# Patient Record
Sex: MALE | Race: WHITE | Employment: FULL TIME | URBAN - METROPOLITAN AREA
[De-identification: names, ages, dates, MRNs, and addresses within clinical notes are randomized per-mention and may not be internally consistent; named-entity substitution may affect disease eponyms.]

---

## 2024-03-21 ENCOUNTER — APPOINTMENT (OUTPATIENT)
Dept: CT IMAGING | Age: 54
End: 2024-03-21
Payer: COMMERCIAL

## 2024-03-21 ENCOUNTER — HOSPITAL ENCOUNTER (INPATIENT)
Age: 54
LOS: 1 days | Discharge: HOME | End: 2024-03-22
Attending: EMERGENCY MEDICINE | Admitting: HOSPITALIST
Payer: COMMERCIAL

## 2024-03-21 ENCOUNTER — APPOINTMENT (OUTPATIENT)
Dept: MRI IMAGING | Age: 54
End: 2024-03-21
Payer: COMMERCIAL

## 2024-03-21 DIAGNOSIS — R20.0 NUMBNESS: Primary | ICD-10-CM

## 2024-03-21 LAB
ALBUMIN SERPL-MCNC: 4.6 G/DL (ref 3.4–5)
ALBUMIN/GLOB SERPL: 1.4 {RATIO} (ref 1.1–2.2)
ALP SERPL-CCNC: 77 U/L (ref 40–129)
ALT SERPL-CCNC: 16 U/L (ref 10–40)
ANION GAP SERPL CALCULATED.3IONS-SCNC: 12 MMOL/L (ref 3–16)
AST SERPL-CCNC: 21 U/L (ref 15–37)
BASOPHILS # BLD: 0.1 K/UL (ref 0–0.2)
BASOPHILS NFR BLD: 0.7 %
BILIRUB SERPL-MCNC: 0.4 MG/DL (ref 0–1)
BUN SERPL-MCNC: 15 MG/DL (ref 7–20)
CALCIUM SERPL-MCNC: 10.3 MG/DL (ref 8.3–10.6)
CHLORIDE SERPL-SCNC: 102 MMOL/L (ref 99–110)
CO2 SERPL-SCNC: 27 MMOL/L (ref 21–32)
CREAT SERPL-MCNC: 0.9 MG/DL (ref 0.9–1.3)
DEPRECATED RDW RBC AUTO: 14.5 % (ref 12.4–15.4)
EOSINOPHIL # BLD: 0.4 K/UL (ref 0–0.6)
EOSINOPHIL NFR BLD: 4.4 %
GFR SERPLBLD CREATININE-BSD FMLA CKD-EPI: >60 ML/MIN/{1.73_M2}
GLUCOSE BLD-MCNC: 95 MG/DL (ref 70–99)
GLUCOSE SERPL-MCNC: 98 MG/DL (ref 70–99)
HCT VFR BLD AUTO: 45.7 % (ref 40.5–52.5)
HGB BLD-MCNC: 15.1 G/DL (ref 13.5–17.5)
IMM GRANULOCYTES # BLD: 0 K/UL (ref 0–0.2)
IMM GRANULOCYTES NFR BLD: 0.2 %
LYMPHOCYTES # BLD: 3.5 K/UL (ref 1–5.1)
LYMPHOCYTES NFR BLD: 40 %
MCH RBC QN AUTO: 29.2 PG (ref 26–34)
MCHC RBC AUTO-ENTMCNC: 33 G/DL (ref 32–36.4)
MCV RBC AUTO: 88.2 FL (ref 80–100)
MONOCYTES # BLD: 0.9 K/UL (ref 0–1.3)
MONOCYTES NFR BLD: 10.4 %
NEUTROPHILS # BLD: 3.9 K/UL (ref 1.7–7.7)
NEUTROPHILS NFR BLD: 44.3 %
PERFORMED ON: NORMAL
PLATELET # BLD AUTO: 269 K/UL (ref 135–450)
PMV BLD AUTO: 9.4 FL (ref 5–10.5)
POTASSIUM SERPL-SCNC: 3.7 MMOL/L (ref 3.5–5.1)
PROT SERPL-MCNC: 8 G/DL (ref 6.4–8.2)
RBC # BLD AUTO: 5.18 M/UL (ref 4.2–5.9)
SODIUM SERPL-SCNC: 141 MMOL/L (ref 136–145)
WBC # BLD AUTO: 8.8 K/UL (ref 4–11)

## 2024-03-21 PROCEDURE — 6370000000 HC RX 637 (ALT 250 FOR IP): Performed by: EMERGENCY MEDICINE

## 2024-03-21 PROCEDURE — 70450 CT HEAD/BRAIN W/O DYE: CPT

## 2024-03-21 PROCEDURE — 96375 TX/PRO/DX INJ NEW DRUG ADDON: CPT

## 2024-03-21 PROCEDURE — 2060000000 HC ICU INTERMEDIATE R&B

## 2024-03-21 PROCEDURE — 70498 CT ANGIOGRAPHY NECK: CPT

## 2024-03-21 PROCEDURE — 70551 MRI BRAIN STEM W/O DYE: CPT

## 2024-03-21 PROCEDURE — 85025 COMPLETE CBC W/AUTO DIFF WBC: CPT

## 2024-03-21 PROCEDURE — 6360000002 HC RX W HCPCS: Performed by: HOSPITALIST

## 2024-03-21 PROCEDURE — 2580000003 HC RX 258: Performed by: EMERGENCY MEDICINE

## 2024-03-21 PROCEDURE — 2580000003 HC RX 258: Performed by: HOSPITALIST

## 2024-03-21 PROCEDURE — 80053 COMPREHEN METABOLIC PANEL: CPT

## 2024-03-21 PROCEDURE — 99285 EMERGENCY DEPT VISIT HI MDM: CPT

## 2024-03-21 PROCEDURE — 94760 N-INVAS EAR/PLS OXIMETRY 1: CPT

## 2024-03-21 PROCEDURE — 6370000000 HC RX 637 (ALT 250 FOR IP): Performed by: HOSPITALIST

## 2024-03-21 PROCEDURE — 6360000002 HC RX W HCPCS: Performed by: EMERGENCY MEDICINE

## 2024-03-21 PROCEDURE — 36415 COLL VENOUS BLD VENIPUNCTURE: CPT

## 2024-03-21 PROCEDURE — 6360000004 HC RX CONTRAST MEDICATION: Performed by: EMERGENCY MEDICINE

## 2024-03-21 PROCEDURE — 82947 ASSAY GLUCOSE BLOOD QUANT: CPT

## 2024-03-21 PROCEDURE — 96374 THER/PROPH/DIAG INJ IV PUSH: CPT

## 2024-03-21 RX ORDER — SODIUM CHLORIDE 9 MG/ML
INJECTION, SOLUTION INTRAVENOUS PRN
Status: DISCONTINUED | OUTPATIENT
Start: 2024-03-21 | End: 2024-03-22 | Stop reason: HOSPADM

## 2024-03-21 RX ORDER — SODIUM CHLORIDE 0.9 % (FLUSH) 0.9 %
5-40 SYRINGE (ML) INJECTION PRN
Status: DISCONTINUED | OUTPATIENT
Start: 2024-03-21 | End: 2024-03-22 | Stop reason: HOSPADM

## 2024-03-21 RX ORDER — POTASSIUM CHLORIDE 20 MEQ/1
40 TABLET, EXTENDED RELEASE ORAL PRN
Status: DISCONTINUED | OUTPATIENT
Start: 2024-03-21 | End: 2024-03-22 | Stop reason: HOSPADM

## 2024-03-21 RX ORDER — ONDANSETRON 2 MG/ML
4 INJECTION INTRAMUSCULAR; INTRAVENOUS EVERY 6 HOURS PRN
Status: DISCONTINUED | OUTPATIENT
Start: 2024-03-21 | End: 2024-03-22

## 2024-03-21 RX ORDER — SODIUM CHLORIDE 0.9 % (FLUSH) 0.9 %
5-40 SYRINGE (ML) INJECTION EVERY 12 HOURS SCHEDULED
Status: DISCONTINUED | OUTPATIENT
Start: 2024-03-21 | End: 2024-03-22

## 2024-03-21 RX ORDER — ONDANSETRON 2 MG/ML
4 INJECTION INTRAMUSCULAR; INTRAVENOUS EVERY 6 HOURS PRN
Status: DISCONTINUED | OUTPATIENT
Start: 2024-03-21 | End: 2024-03-21

## 2024-03-21 RX ORDER — METOCLOPRAMIDE HYDROCHLORIDE 5 MG/ML
10 INJECTION INTRAMUSCULAR; INTRAVENOUS ONCE
Status: COMPLETED | OUTPATIENT
Start: 2024-03-21 | End: 2024-03-21

## 2024-03-21 RX ORDER — ASPIRIN 300 MG/1
300 SUPPOSITORY RECTAL DAILY
Status: DISCONTINUED | OUTPATIENT
Start: 2024-03-21 | End: 2024-03-21

## 2024-03-21 RX ORDER — ACETAMINOPHEN 325 MG/1
650 TABLET ORAL EVERY 6 HOURS PRN
Status: DISCONTINUED | OUTPATIENT
Start: 2024-03-21 | End: 2024-03-22 | Stop reason: HOSPADM

## 2024-03-21 RX ORDER — ASPIRIN 81 MG/1
324 TABLET, CHEWABLE ORAL ONCE
Status: COMPLETED | OUTPATIENT
Start: 2024-03-21 | End: 2024-03-21

## 2024-03-21 RX ORDER — POTASSIUM CHLORIDE 7.45 MG/ML
10 INJECTION INTRAVENOUS PRN
Status: DISCONTINUED | OUTPATIENT
Start: 2024-03-21 | End: 2024-03-21

## 2024-03-21 RX ORDER — ONDANSETRON 2 MG/ML
4 INJECTION INTRAMUSCULAR; INTRAVENOUS EVERY 6 HOURS PRN
Status: DISCONTINUED | OUTPATIENT
Start: 2024-03-21 | End: 2024-03-22 | Stop reason: HOSPADM

## 2024-03-21 RX ORDER — POLYETHYLENE GLYCOL 3350 17 G/17G
17 POWDER, FOR SOLUTION ORAL DAILY PRN
Status: DISCONTINUED | OUTPATIENT
Start: 2024-03-21 | End: 2024-03-22

## 2024-03-21 RX ORDER — OMEPRAZOLE 40 MG/1
40 CAPSULE, DELAYED RELEASE ORAL DAILY
COMMUNITY
Start: 2015-07-22

## 2024-03-21 RX ORDER — ONDANSETRON 4 MG/1
4 TABLET, ORALLY DISINTEGRATING ORAL EVERY 8 HOURS PRN
Status: DISCONTINUED | OUTPATIENT
Start: 2024-03-21 | End: 2024-03-21

## 2024-03-21 RX ORDER — POLYETHYLENE GLYCOL 3350 17 G/17G
17 POWDER, FOR SOLUTION ORAL DAILY PRN
Status: DISCONTINUED | OUTPATIENT
Start: 2024-03-21 | End: 2024-03-21

## 2024-03-21 RX ORDER — ENOXAPARIN SODIUM 100 MG/ML
40 INJECTION SUBCUTANEOUS DAILY
Status: DISCONTINUED | OUTPATIENT
Start: 2024-03-21 | End: 2024-03-21

## 2024-03-21 RX ORDER — MAGNESIUM SULFATE IN WATER 40 MG/ML
2000 INJECTION, SOLUTION INTRAVENOUS PRN
Status: DISCONTINUED | OUTPATIENT
Start: 2024-03-21 | End: 2024-03-22 | Stop reason: HOSPADM

## 2024-03-21 RX ORDER — SODIUM CHLORIDE 9 MG/ML
INJECTION, SOLUTION INTRAVENOUS PRN
Status: DISCONTINUED | OUTPATIENT
Start: 2024-03-21 | End: 2024-03-22

## 2024-03-21 RX ORDER — ASPIRIN 81 MG/1
81 TABLET, CHEWABLE ORAL DAILY
Status: DISCONTINUED | OUTPATIENT
Start: 2024-03-21 | End: 2024-03-21

## 2024-03-21 RX ORDER — PANTOPRAZOLE SODIUM 40 MG/1
40 TABLET, DELAYED RELEASE ORAL
Status: DISCONTINUED | OUTPATIENT
Start: 2024-03-22 | End: 2024-03-22 | Stop reason: HOSPADM

## 2024-03-21 RX ORDER — ROSUVASTATIN CALCIUM 40 MG/1
40 TABLET, COATED ORAL NIGHTLY
Status: DISCONTINUED | OUTPATIENT
Start: 2024-03-21 | End: 2024-03-22 | Stop reason: HOSPADM

## 2024-03-21 RX ORDER — 0.9 % SODIUM CHLORIDE 0.9 %
1000 INTRAVENOUS SOLUTION INTRAVENOUS ONCE
Status: COMPLETED | OUTPATIENT
Start: 2024-03-21 | End: 2024-03-21

## 2024-03-21 RX ORDER — ONDANSETRON 4 MG/1
4 TABLET, ORALLY DISINTEGRATING ORAL EVERY 8 HOURS PRN
Status: DISCONTINUED | OUTPATIENT
Start: 2024-03-21 | End: 2024-03-22

## 2024-03-21 RX ORDER — POLYETHYLENE GLYCOL 3350 17 G/17G
17 POWDER, FOR SOLUTION ORAL DAILY PRN
Status: DISCONTINUED | OUTPATIENT
Start: 2024-03-21 | End: 2024-03-22 | Stop reason: HOSPADM

## 2024-03-21 RX ORDER — POTASSIUM CHLORIDE 20 MEQ/1
40 TABLET, EXTENDED RELEASE ORAL PRN
Status: DISCONTINUED | OUTPATIENT
Start: 2024-03-21 | End: 2024-03-21

## 2024-03-21 RX ORDER — ACETAMINOPHEN 325 MG/1
650 TABLET ORAL EVERY 6 HOURS PRN
Status: DISCONTINUED | OUTPATIENT
Start: 2024-03-21 | End: 2024-03-21 | Stop reason: SDUPTHER

## 2024-03-21 RX ORDER — SODIUM CHLORIDE 0.9 % (FLUSH) 0.9 %
5-40 SYRINGE (ML) INJECTION PRN
Status: DISCONTINUED | OUTPATIENT
Start: 2024-03-21 | End: 2024-03-22

## 2024-03-21 RX ORDER — ACETAMINOPHEN 650 MG/1
650 SUPPOSITORY RECTAL EVERY 6 HOURS PRN
Status: DISCONTINUED | OUTPATIENT
Start: 2024-03-21 | End: 2024-03-22 | Stop reason: HOSPADM

## 2024-03-21 RX ORDER — DIPHENHYDRAMINE HYDROCHLORIDE 50 MG/ML
25 INJECTION INTRAMUSCULAR; INTRAVENOUS ONCE
Status: COMPLETED | OUTPATIENT
Start: 2024-03-21 | End: 2024-03-21

## 2024-03-21 RX ORDER — ASPIRIN 81 MG/1
81 TABLET, CHEWABLE ORAL DAILY
Status: DISCONTINUED | OUTPATIENT
Start: 2024-03-22 | End: 2024-03-22 | Stop reason: HOSPADM

## 2024-03-21 RX ORDER — ENOXAPARIN SODIUM 100 MG/ML
40 INJECTION SUBCUTANEOUS NIGHTLY
Status: DISCONTINUED | OUTPATIENT
Start: 2024-03-21 | End: 2024-03-21

## 2024-03-21 RX ORDER — ONDANSETRON 4 MG/1
4 TABLET, ORALLY DISINTEGRATING ORAL EVERY 8 HOURS PRN
Status: DISCONTINUED | OUTPATIENT
Start: 2024-03-21 | End: 2024-03-22 | Stop reason: HOSPADM

## 2024-03-21 RX ORDER — SODIUM CHLORIDE 0.9 % (FLUSH) 0.9 %
5-40 SYRINGE (ML) INJECTION EVERY 12 HOURS SCHEDULED
Status: DISCONTINUED | OUTPATIENT
Start: 2024-03-21 | End: 2024-03-22 | Stop reason: HOSPADM

## 2024-03-21 RX ORDER — ACETAMINOPHEN 650 MG/1
650 SUPPOSITORY RECTAL EVERY 6 HOURS PRN
Status: DISCONTINUED | OUTPATIENT
Start: 2024-03-21 | End: 2024-03-21 | Stop reason: SDUPTHER

## 2024-03-21 RX ORDER — POTASSIUM CHLORIDE 7.45 MG/ML
10 INJECTION INTRAVENOUS PRN
Status: DISCONTINUED | OUTPATIENT
Start: 2024-03-21 | End: 2024-03-22 | Stop reason: HOSPADM

## 2024-03-21 RX ORDER — ENOXAPARIN SODIUM 100 MG/ML
40 INJECTION SUBCUTANEOUS NIGHTLY
Status: DISCONTINUED | OUTPATIENT
Start: 2024-03-21 | End: 2024-03-22 | Stop reason: HOSPADM

## 2024-03-21 RX ADMIN — DIPHENHYDRAMINE HYDROCHLORIDE 25 MG: 50 INJECTION INTRAMUSCULAR; INTRAVENOUS at 07:26

## 2024-03-21 RX ADMIN — ASPIRIN 324 MG: 81 TABLET, CHEWABLE ORAL at 09:44

## 2024-03-21 RX ADMIN — Medication 10 ML: at 21:03

## 2024-03-21 RX ADMIN — IOMEPROL INJECTION 100 ML: 714 INJECTION, SOLUTION INTRAVASCULAR at 07:47

## 2024-03-21 RX ADMIN — ROSUVASTATIN CALCIUM 40 MG: 40 TABLET, FILM COATED ORAL at 21:00

## 2024-03-21 RX ADMIN — ENOXAPARIN SODIUM 40 MG: 100 INJECTION SUBCUTANEOUS at 21:00

## 2024-03-21 RX ADMIN — SODIUM CHLORIDE 1000 ML: 9 INJECTION, SOLUTION INTRAVENOUS at 07:26

## 2024-03-21 RX ADMIN — METOCLOPRAMIDE 10 MG: 5 INJECTION, SOLUTION INTRAMUSCULAR; INTRAVENOUS at 07:29

## 2024-03-21 ASSESSMENT — PAIN - FUNCTIONAL ASSESSMENT
PAIN_FUNCTIONAL_ASSESSMENT: NONE - DENIES PAIN
PAIN_FUNCTIONAL_ASSESSMENT: 0-10
PAIN_FUNCTIONAL_ASSESSMENT: 0-10

## 2024-03-21 ASSESSMENT — PAIN SCALES - GENERAL
PAINLEVEL_OUTOF10: 0

## 2024-03-21 NOTE — ED PROVIDER NOTES
Emergency Physician Note  [facility]W PROGRESSIVE CARE    Pt Name: Eh Price  MRN: [id number]  Birthdate 1970  Date of evaluation: 3/21/2024  Provider: Brando King MD  PCP: No primary care provider on file.    Note Open Time: 6:46 PM EDT 3/21/24    Chief Complaint  Numbness (Pt states he started with numbness to left hip and leg 2 days ago, states yesterday felt numbness to left side and 12 hours ago numbness spread to left shoulder and arm)       History of Present Illness  Eh Price is a 53 y.o. male who presents to the ED for numbness.  Patient reports that he had some left leg and hip numbness 2 days ago that he related to some back pain.  And then since yesterday has had left facial and arm numbness.  He also has a right-sided temporal headache.  He has a history of migraines.  He denies any other complaints.  No prior history of stroke or TIA.  Patient denies any fevers, chills or sweats.  No chest pain or shortness of breath.  No vomiting.  No recent illnesses.  No trouble with his vision or speech or balance.  No weakness.    History from : Patient    Limitations to history : None    REVIEW OF SYSTEMS :      Review of Systems    Positives and Pertinent negatives as per HPI.     Medications/allergies/medical/social/family history  I have reviewed the following from the nursing documentation:      Prior to Admission medications    Not on File       Allergies as of 03/21/2024    (No Known Allergies)       No past medical history on file.     Surgical History: No past surgical history on file.     Family History:  No family history on file.    Social History     Socioeconomic History    Marital status: Single     Spouse name: Not on file    Number of children: Not on file    Years of education: Not on file    Highest education level: Not on file   Occupational History    Not on file   Tobacco Use    Smoking status: Not on file    Smokeless tobacco: Not on file   Substance and Sexual Activity     by any other providers.          I am the Primary Clinician of Record.    FINAL IMPRESSION      1. Numbness          DISPOSITION/PLAN     Pt is in stable condition upon Admit to telemetry.    PATIENT REFERRED TO:  No follow-up provider specified.    DISCHARGE MEDICATIONS:  There are no discharge medications for this patient.      DISCONTINUED MEDICATIONS:  There are no discharge medications for this patient.             (Please note that portions of this note were completed with a voice recognition program.  Efforts were made to edit the dictations but occasionally words are mis-transcribed.)    Brando King MD (electronically signed)          This chart was generated using the Dragon dictation system.  I created this record but it may contain dictation errors.          Brando King MD  03/21/24 6634

## 2024-03-21 NOTE — PROGRESS NOTES
4 Eyes Skin Assessment     NAME:  Eh Price  YOB: 1970  MEDICAL RECORD NUMBER:  [id number]    The patient is being assessed for  Admission    I agree that at least one RN has performed a thorough Head to Toe Skin Assessment on the patient. ALL assessment sites listed below have been assessed.      Areas assessed by both nurses:    Head, Face, Ears, Shoulders, Back, Chest, Arms, Elbows, Hands, Sacrum. Buttock, Coccyx, Ischium, and Legs. Feet and Heels        Does the Patient have a Wound? No noted wound(s)       Miki Prevention initiated by RN: Yes  Wound Care Orders initiated by RN: No    Pressure Injury (Stage 3,4, Unstageable, DTI, NWPT, and Complex wounds) if present, place Wound referral order by RN under : No    New Ostomies, if present place, Ostomy referral order under : No     Nurse 1 eSignature: Electronically signed by Sarah Rodrigues RN on 3/21/24 at 5:39 PM EDT    **SHARE this note so that the co-signing nurse can place an eSignature**    Nurse 2 eSignature: Electronically signed by Cm Casiano RN on 3/22/24 at 4:00 AM EDT

## 2024-03-21 NOTE — ED NOTES
Numbness started 2 days ago in left leg.  Patient stated he would wait till the next day have it checked out.  He decided to wait and then this morning when he woke up and had face numbness and drove himself to the ER.

## 2024-03-21 NOTE — H&P
V2.0  History and Physical      Name:  Eh Price /Age/Sex: 1970  (53 y.o. male)   MRN & CSN:  [id number] & [id number] Encounter Date/Time: 3/21/2024 3:19 PM EDT   Location:  [place] [address]/5107-01 PCP: No primary care provider on file.       Hospital Day: 1    Assessment and Plan:         #. Left sided paresthesias in a patient with hx of migrain   ? Complex migrain  TIA  can not be excluded  Symptoms onset 2 days prior to admission  No moter deficit  CT head , CTA head and neck Non acute  Obtain MRI  Initiate stroke protocol with frequent neuro check  Aic, lipid, MRI, echo  Aspirin, statin, plavix  Neuro consult    #. Hx of GERD  PPI    DVT Prophylaxis: lovenox  Code status: full support      Chief Complain:    Left side numbness/ paresthesias.   History of Present Illness:     Patient was hx of migraine, gerd who presented to [facility] as a transfer from [place] after experience paresthesias of the left side. States initially started involving the left Lower extremity 2 days ago, then left upper extremity yesterday and then his left face and scalp last night. Denies any moter deficits. Denies sob, chest pain, nausea, vomiting.      Review of Systems:        Pertinent positives and negatives discussed in HPI     Objective:   No intake or output data in the 24 hours ending 24 1519   Vitals:   Vitals:    24 1346 24 1356 24 1406 24 1445   BP: (!) 159/90 (!) 137/92  (!) 158/94   Pulse: 66 67  69   Resp: 12   18   Temp:   97.5 °F (36.4 °C) 98.2 °F (36.8 °C)   TempSrc:   Tympanic Oral   SpO2:    97%   Weight:       Height:           Medications Prior to Admission     Prior to Admission medications    Not on File       Physical Exam:    Physical Exam     General:awake, alert, in NAD  Eyes: EOMI  ENT: neck supple  Cardiovascular: Regular rate and Rhythm, normal S1, S2. No immures  Respiratory: Clear to auscultation  Gastrointestinal: Soft, non tender, no organomegaly,

## 2024-03-22 VITALS
RESPIRATION RATE: 16 BRPM | TEMPERATURE: 98.4 F | OXYGEN SATURATION: 95 % | SYSTOLIC BLOOD PRESSURE: 136 MMHG | BODY MASS INDEX: 27.82 KG/M2 | HEIGHT: 69 IN | WEIGHT: 187.83 LBS | DIASTOLIC BLOOD PRESSURE: 87 MMHG | HEART RATE: 69 BPM

## 2024-03-22 LAB
ANION GAP SERPL CALCULATED.3IONS-SCNC: 10 MMOL/L (ref 3–16)
BUN SERPL-MCNC: 11 MG/DL (ref 7–20)
CALCIUM SERPL-MCNC: 9.4 MG/DL (ref 8.3–10.6)
CHLORIDE SERPL-SCNC: 101 MMOL/L (ref 99–110)
CHOLEST SERPL-MCNC: 208 MG/DL (ref 0–199)
CO2 SERPL-SCNC: 25 MMOL/L (ref 21–32)
CREAT SERPL-MCNC: 0.7 MG/DL (ref 0.9–1.3)
DEPRECATED RDW RBC AUTO: 15.4 % (ref 12.4–15.4)
EST. AVERAGE GLUCOSE BLD GHB EST-MCNC: 114 MG/DL
GFR SERPLBLD CREATININE-BSD FMLA CKD-EPI: >60 ML/MIN/{1.73_M2}
GLUCOSE SERPL-MCNC: 87 MG/DL (ref 70–99)
HBA1C MFR BLD: 5.6 %
HCT VFR BLD AUTO: 43.5 % (ref 40.5–52.5)
HDLC SERPL-MCNC: 49 MG/DL (ref 40–60)
HGB BLD-MCNC: 14.7 G/DL (ref 13.5–17.5)
LDLC SERPL CALC-MCNC: 133 MG/DL
MCH RBC QN AUTO: 29.4 PG (ref 26–34)
MCHC RBC AUTO-ENTMCNC: 33.7 G/DL (ref 31–36)
MCV RBC AUTO: 87.2 FL (ref 80–100)
PLATELET # BLD AUTO: 249 K/UL (ref 135–450)
PLATELET BLD QL SMEAR: ADEQUATE
PMV BLD AUTO: 8.5 FL (ref 5–10.5)
POTASSIUM SERPL-SCNC: 3.9 MMOL/L (ref 3.5–5.1)
RBC # BLD AUTO: 4.99 M/UL (ref 4.2–5.9)
SODIUM SERPL-SCNC: 136 MMOL/L (ref 136–145)
TRIGL SERPL-MCNC: 129 MG/DL (ref 0–150)
VLDLC SERPL CALC-MCNC: 26 MG/DL
WBC # BLD AUTO: 8 K/UL (ref 4–11)

## 2024-03-22 PROCEDURE — 85027 COMPLETE CBC AUTOMATED: CPT

## 2024-03-22 PROCEDURE — 80061 LIPID PANEL: CPT

## 2024-03-22 PROCEDURE — 2580000003 HC RX 258: Performed by: HOSPITALIST

## 2024-03-22 PROCEDURE — 80048 BASIC METABOLIC PNL TOTAL CA: CPT

## 2024-03-22 PROCEDURE — 6370000000 HC RX 637 (ALT 250 FOR IP): Performed by: HOSPITALIST

## 2024-03-22 PROCEDURE — 36415 COLL VENOUS BLD VENIPUNCTURE: CPT

## 2024-03-22 PROCEDURE — 6360000002 HC RX W HCPCS: Performed by: HOSPITALIST

## 2024-03-22 PROCEDURE — 93306 TTE W/DOPPLER COMPLETE: CPT

## 2024-03-22 PROCEDURE — 9990000010 HC NO CHARGE VISIT: Performed by: PHYSICAL THERAPIST

## 2024-03-22 PROCEDURE — 94760 N-INVAS EAR/PLS OXIMETRY 1: CPT

## 2024-03-22 PROCEDURE — 83036 HEMOGLOBIN GLYCOSYLATED A1C: CPT

## 2024-03-22 RX ORDER — PROCHLORPERAZINE EDISYLATE 5 MG/ML
10 INJECTION INTRAMUSCULAR; INTRAVENOUS ONCE
Status: COMPLETED | OUTPATIENT
Start: 2024-03-22 | End: 2024-03-22

## 2024-03-22 RX ORDER — NICOTINE 21 MG/24HR
1 PATCH, TRANSDERMAL 24 HOURS TRANSDERMAL DAILY
Status: DISCONTINUED | OUTPATIENT
Start: 2024-03-22 | End: 2024-03-22 | Stop reason: HOSPADM

## 2024-03-22 RX ORDER — KETOROLAC TROMETHAMINE 15 MG/ML
15 INJECTION, SOLUTION INTRAMUSCULAR; INTRAVENOUS ONCE
Status: COMPLETED | OUTPATIENT
Start: 2024-03-22 | End: 2024-03-22

## 2024-03-22 RX ORDER — DIPHENHYDRAMINE HYDROCHLORIDE 50 MG/ML
12.5 INJECTION INTRAMUSCULAR; INTRAVENOUS ONCE
Status: COMPLETED | OUTPATIENT
Start: 2024-03-22 | End: 2024-03-22

## 2024-03-22 RX ADMIN — PANTOPRAZOLE SODIUM 40 MG: 40 TABLET, DELAYED RELEASE ORAL at 08:02

## 2024-03-22 RX ADMIN — ACETAMINOPHEN 325MG 650 MG: 325 TABLET ORAL at 08:02

## 2024-03-22 RX ADMIN — ASPIRIN 81 MG: 81 TABLET, CHEWABLE ORAL at 08:02

## 2024-03-22 RX ADMIN — DIPHENHYDRAMINE HYDROCHLORIDE 12.5 MG: 50 INJECTION INTRAMUSCULAR; INTRAVENOUS at 10:29

## 2024-03-22 RX ADMIN — PROCHLORPERAZINE EDISYLATE 10 MG: 5 INJECTION INTRAMUSCULAR; INTRAVENOUS at 10:29

## 2024-03-22 RX ADMIN — KETOROLAC TROMETHAMINE 15 MG: 15 INJECTION, SOLUTION INTRAMUSCULAR; INTRAVENOUS at 10:29

## 2024-03-22 RX ADMIN — Medication 10 ML: at 08:03

## 2024-03-22 ASSESSMENT — PAIN - FUNCTIONAL ASSESSMENT: PAIN_FUNCTIONAL_ASSESSMENT: ACTIVITIES ARE NOT PREVENTED

## 2024-03-22 ASSESSMENT — PAIN DESCRIPTION - DESCRIPTORS: DESCRIPTORS: ACHING

## 2024-03-22 ASSESSMENT — PAIN DESCRIPTION - ORIENTATION: ORIENTATION: RIGHT

## 2024-03-22 ASSESSMENT — PAIN SCALES - GENERAL: PAINLEVEL_OUTOF10: 3

## 2024-03-22 ASSESSMENT — PAIN DESCRIPTION - LOCATION: LOCATION: HEAD;HIP;LEG

## 2024-03-22 NOTE — DISCHARGE SUMMARY
V2.0  Discharge Summary    Name:  Eh Price /Age/Sex: 1970 (53 y.o. male)   Admit Date: 3/21/2024  Discharge Date: 3/22/24    MRN & CSN:  [id number] & [id number] Encounter Date and Time 3/22/24 11:23 AM EDT    Attending:  José Miguel Herbert MD Discharging Provider: José Miguel Herbert MD       Hospital Course:       Patient is a 53-year-old male past medical history of GERD, migraine, who presented to the ED with left-sided paresthesia.  Onset 3 days prior to admission.  Patient had a CT head, CTA head and neck which were nonacute.  Patient underwent stroke protocol with MRI which did not show any evidence of acute stroke.  Patient was given a migraine cocktail with Benadryl, Compazine and Toradol with improvement in his headache after hospitalization.  Suspected symptoms are likely due to complex migraine.  He was seen by neurology during hospitalization and they did not recommend any further neurological testing.  Patient's symptoms have improved and since MRI is negative and since he has benefited the most of hospitalization, he will be discharged today.  Condition.  He was advised to follow-up with his PCP for transition of care.      Discharge Instruction:     Primary care physician: No primary care provider on file. within 2 weeks  Diet: cardiac diet   Activity: activity as tolerated  Disposition: Discharged to:   [x]Home, []C, []SNF, []Acute Rehab, []Hospice   Condition on discharge: Stable    Discharge Medications:        Medication List        CONTINUE taking these medications      nicotine 7 MG/24HR  Commonly known as: NICODERM CQ     omeprazole 40 MG delayed release capsule  Commonly known as: PRILOSEC             Objective Findings at Discharge:   /87   Pulse 69   Temp 98.4 °F (36.9 °C) (Oral)   Resp 16   Ht 1.753 m (5' 9\")   Wt 85.2 kg (187 lb 13.3 oz)   SpO2 95%   BMI 27.74 kg/m²       Physical Exam:     General: awake, alert, and in NAD  Eyes: EOMI  ENT: neck supple  Cardiovascular:  identified. 3D surface reformations were performed and concur with the above findings.     Unremarkable CTA of the head and neck.     CT Head W/O Contrast    Result Date: 3/21/2024  EXAMINATION: CT OF THE HEAD WITHOUT CONTRAST  3/21/2024 7:42 am TECHNIQUE: CT of the head was performed without the administration of intravenous contrast. Automated exposure control, iterative reconstruction, and/or weight based adjustment of the mA/kV was utilized to reduce the radiation dose to as low as reasonably achievable. COMPARISON: None. HISTORY: ORDERING SYSTEM PROVIDED HISTORY: left sided numbness TECHNOLOGIST PROVIDED HISTORY: Reason for exam:->left sided numbness Has a \"code stroke\" or \"stroke alert\" been called?->No Decision Support Exception - unselect if not a suspected or confirmed emergency medical condition->Emergency Medical Condition (MA) Reason for Exam: pt is having left sided leg numbness for 2 days and started with entire left side numbness yesterday FINDINGS: BRAIN/VENTRICLES: There is no acute intracranial hemorrhage, mass effect or midline shift.  No abnormal extra-axial fluid collection.  The gray-white differentiation is maintained without evidence of an acute infarct.  There is no evidence of hydrocephalus. ORBITS: The visualized portion of the orbits demonstrate no acute abnormality. SINUSES: The visualized paranasal sinuses and mastoid air cells demonstrate no acute abnormality. SOFT TISSUES/SKULL:  No acute abnormality of the visualized skull or soft tissues.     No acute intracranial abnormality.       CBC:   Recent Labs     03/21/24  0705 03/22/24  0516   WBC 8.8 8.0   HGB 15.1 14.7    249     BMP:    Recent Labs     03/21/24  0705 03/22/24  0516    136   K 3.7 3.9    101   CO2 27 25   BUN 15 11   CREATININE 0.9 0.7*   GLUCOSE 98 87     Hepatic:   Recent Labs     03/21/24  0705   AST 21   ALT 16   BILITOT 0.4   ALKPHOS 77     Lipids:   Lab Results   Component Value Date/Time    CHOL

## 2024-03-22 NOTE — CONSULTS
Neurology Consult Note  Reason for Consult: left sided numbness, CVA like symptoms    Chief complaint: numbness    José Miguel Pemberton MD asked me to see Eh Price in consultation for evaluation of left sided numbness, CVA like symptoms    History of Present Illness:  Eh Price is a 53 y.o. male who presents with numbness.     I obtained my information via interview w/ the patient, supplemented by chart review.     The patient says that roughly 3 weeks ago he must have done something to his lower back area because he was having some spasms and some numbness in that region on the L.  On Tuesday the numbness started to involve his LLE above the knee.  Wednesday the numbness then moved up into his entire LUE.  In the evening on Wednesday he noticed his face started to feel numb.  He had a mild headache though not a migraine.  He ended up coming to the ED yesterday morning in order to be evaluated.      He says that last night around midnight he noticed he was improving and by this morning the numbness in his face/LUE had resolved but he is still having some numbness in his L lower back and thigh area.      He says he does get migraines w/ visual aura though has never had any additional neurologic deficits.  He usually will get a cluster of headaches w/ light sensitivity every 3-4 months.  He takes imitrex PRN.      He denies ever feeling anything like this in the past.      Medical History:  History reviewed. No pertinent past medical history.    History reviewed. No pertinent surgical history.    Scheduled Meds:   diphenhydrAMINE  12.5 mg IntraVENous Once    ketorolac  15 mg IntraVENous Once    prochlorperazine  10 mg IntraVENous Once    nicotine  1 patch TransDERmal Daily    aspirin  81 mg Oral Daily    sodium chloride flush  5-40 mL IntraVENous 2 times per day    enoxaparin  40 mg SubCUTAneous Nightly    rosuvastatin  40 mg Oral Nightly    pantoprazole  40 mg Oral QAM AC     Medications Prior to Admission:    omeprazole (PRILOSEC) 40 MG delayed release capsule, Take 1 capsule by mouth daily  nicotine (NICODERM CQ) 7 MG/24HR, Place 1 patch onto the skin daily as needed (nicotine withdrawal)    No Known Allergies    History reviewed. No pertinent family history.    Social History     Tobacco Use   Smoking Status Some Days    Current packs/day: 0.50    Average packs/day: 0.5 packs/day for 9.1 years (4.5 ttl pk-yrs)    Types: Cigarettes    Start date: 3/1/2015   Smokeless Tobacco Never     Social History     Substance and Sexual Activity   Drug Use Never     Social History     Substance and Sexual Activity   Alcohol Use Not Currently     ROS  Constitutional- No weight loss or fevers  Eyes- No diplopia. No photophobia.  Ears/nose/throat- No dysphagia. No Dysarthria  Cardiovascular- No palpitations. No chest pain  Respiratory- No dyspnea. No Cough  Gastrointestinal- No Abdominal pain. No Vomiting.  Genitourinary- No incontinence. No urinary retention  Musculoskeletal- No myalgia. No arthralgia  Skin- No rash. No easy bruising.  Psychiatric- No depression. No anxiety  Endocrine- No diabetes. No thyroid issues.  Hematologic- No bleeding difficulty. No fatigue  Neurologic- No weakness. No Headache.    Exam  Blood pressure 136/87, pulse 69, temperature 98.4 °F (36.9 °C), temperature source Oral, resp. rate 16, height 1.753 m (5' 9\"), weight 85.2 kg (187 lb 13.3 oz), SpO2 95 %.  Constitutional    Vital signs: BP, HR, and RR reviewed   General alert, no distress  Eyes: unable to visualize the fundi  Cardiovascular: no peripheral edema.  Psychiatric: cooperative with examination, no psychotic behavior noted.  Neurologic  Mental status:   orientation to person, place, time.     General fund of knowledge grossly intact   Memory grossly intact   Attention intact as able to attend well to the exam     Language fluent in conversation   Comprehension intact; follows simple commands  Cranial nerves:   CN2: visual fields full   CN 3,4,6:

## 2024-03-22 NOTE — CARE COORDINATION
Discharge Planning:      (CM) reviewed the patient's chart to assess needs. Patient's Readmission Risk Score is 5%. Patient's medical insurance is  Payor: BCBS / Plan: BCBS OUT OF STATE / Product Type: *No Product type* / .  Patient's PCP is No primary care provider on file. .  No needs anticipated, at this time. CM team to follow. Staff to inform CM if additional discharge needs arise.    Pts preferred pharmacy is   [id number] - [place], [place] - [address] [place] - [place] [phone] - F [phone]  [address] [place]  [place] [address]  Phone: [phone] Fax: [phone]    Electronically signed by LISA CHOUDHARY RN on 3/22/2024 at 9:14 AM

## 2024-03-22 NOTE — PROGRESS NOTES
Physical Therapy    Eh Price  [id number]  [id number]  PT orders received and chart reviewed; attempted to see for PT session however pt up in room Ind and has not PT needs; pt reports most of the numbness has resolved.  Pt did report some sciatic pain recently which he initially thought was causing the numbness until his face became numb.  Educated pt on some easy stretches to alleviate the sciatic/low back discomfort and pt able to demonstrate understanding.  No formal eval completed due to pt's Ind with mobility tasks. Will sign off from therapy at this time.  Electronically signed by JESUS VARELA PT on 3/22/2024 at 9:03 AM

## 2024-03-22 NOTE — PROGRESS NOTES
Patient complained of migraine this AM. Patient has history of migraines. PRN Acetaminophen given and RN messaged MD Piedad via [employer]. MD ordered a one time dose of IV Benadryl, Toradol, and Compazine. RN to give. Patient denies stroke-like symptoms other than slight numbness and tingling in the LLE, which patient stated could be related to his history of back surgery/injury. RN scored patient a 1 on the NIHSS Stroke Scale because of this slight numbness and tinging in the LLE. However, MRI was negative.    Patient ambulates independently in room without assistance. RN updated patient on POC. Patient verbalized understanding. Call button within reach. Patient calls appropriately. Care ongoing.

## 2024-03-22 NOTE — PLAN OF CARE
Problem: Discharge Planning  Goal: Discharge to home or other facility with appropriate resources  3/22/2024 1133 by Bettie Morales RN  Outcome: Completed  Flowsheets (Taken 3/22/2024 0817)  Discharge to home or other facility with appropriate resources:   Identify barriers to discharge with patient and caregiver   Arrange for needed discharge resources and transportation as appropriate   Identify discharge learning needs (meds, wound care, etc)  3/22/2024 0025 by Cm Casiano RN  Outcome: Progressing  Flowsheets (Taken 3/21/2024 2036)  Discharge to home or other facility with appropriate resources:   Identify barriers to discharge with patient and caregiver   Arrange for needed discharge resources and transportation as appropriate   Identify discharge learning needs (meds, wound care, etc)   Refer to discharge planning if patient needs post-hospital services based on physician order or complex needs related to functional status, cognitive ability or social support system     Problem: Pain  Goal: Verbalizes/displays adequate comfort level or baseline comfort level  3/22/2024 1133 by Bettie Morales RN  Outcome: Completed  3/22/2024 0025 by Cm Casiano RN  Outcome: Progressing     Problem: Neurosensory - Adult  Goal: Achieves stable or improved neurological status  Outcome: Completed     Problem: Respiratory - Adult  Goal: Achieves optimal ventilation and oxygenation  Outcome: Completed     Problem: Cardiovascular - Adult  Goal: Maintains optimal cardiac output and hemodynamic stability  Outcome: Completed     Problem: Skin/Tissue Integrity - Adult  Goal: Skin integrity remains intact  Outcome: Completed     Problem: Musculoskeletal - Adult  Goal: Return mobility to safest level of function  Outcome: Completed  Goal: Return ADL status to a safe level of function  Outcome: Completed     Problem: Gastrointestinal - Adult  Goal: Minimal or absence of nausea and vomiting  Outcome: Completed     Problem:

## 2024-03-22 NOTE — PLAN OF CARE
Problem: Discharge Planning  Goal: Discharge to home or other facility with appropriate resources  3/22/2024 0025 by Cm Casiano, RN  Outcome: Progressing  Flowsheets (Taken 3/21/2024 2036)  Discharge to home or other facility with appropriate resources:   Identify barriers to discharge with patient and caregiver   Arrange for needed discharge resources and transportation as appropriate   Identify discharge learning needs (meds, wound care, etc)   Refer to discharge planning if patient needs post-hospital services based on physician order or complex needs related to functional status, cognitive ability or social support system       Problem: Pain  Goal: Verbalizes/displays adequate comfort level or baseline comfort level  3/22/2024 0025 by Cm Casiano, RN  Outcome: Progressing

## 2024-03-22 NOTE — PROGRESS NOTES
Verbal and typed discharge education provided to patient. Patient verbalized understanding. Peripheral IV and heart monitor removed. Portable monitor returned to [facility]. Patient ambulated independently off of the Unit to be taken to vehicle at [facility] in [place] by family.

## 2024-03-22 NOTE — PROGRESS NOTES
SLP NOTE    SLP eval/tx order received per stroke r/o protocol.  Patient met at bedside. SLP role/evaluation objectives discussed with patient; patient denies increased motor speech, receptive/expressive/cognitive language, and/or swallowing difficulties at this time.  Patient politely requests to decline SLP eval.    ST to discontinue evaluation attempts per patient's request. Please re-consult ST should status change and/or if medical team deems evaluation necessary.    Thank you.  Galina Roland MA, CCC-SLP, #[id number]  Speech-Language Pathologist  Portable phone: [phone]

## 2024-03-22 NOTE — PROGRESS NOTES
Occupational Therapy      OT order received. Pt reports numbness has mostly resolved and does not interfere with functional mobility/ADLs. No formal OT evaluation warranted. Will sign off.    Electronically signed by Amparo Ritchie OT on 3/22/2024 at 9:32 AM

## 2024-06-26 ENCOUNTER — PROCEDURE VISIT (OUTPATIENT)
Dept: AUDIOLOGY | Age: 54
End: 2024-06-26
Payer: COMMERCIAL

## 2024-06-26 DIAGNOSIS — H90.3 SENSORINEURAL HEARING LOSS, BILATERAL: Primary | ICD-10-CM

## 2024-06-26 PROCEDURE — 92557 COMPREHENSIVE HEARING TEST: CPT

## 2024-06-26 PROCEDURE — 92567 TYMPANOMETRY: CPT

## 2024-06-26 NOTE — PROGRESS NOTES
Eh Price   1970, 53 y.o. male   [id number]       Referring Provider: SELF  Referral Type:  SELF    Reason for Visit: Evaluation of suspected change in hearing, tinnitus, or balance.    ADULT AUDIOLOGIC EVALUATION      Eh Price is a 53 y.o. male seen today, 6/26/2024 , for an initial audiologic evaluation.     AUDIOLOGIC AND OTHER PERTINENT MEDICAL HISTORY:      Eh Price reports gradual decrease in hearing bilaterally. He notes wearing a hearing aid in the right ear only roughly 10 years ago. He saw an ENT at the time of obtaining his hearing aid and MRI was unremarkable at the time. He said he lost in a move and then never wore one after. He does not some aural fullness intermittently attributed to sinuses. Family history of hearing loss in father who was born deaf due to premature birth. 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     He denied otalgia, aural fullness, otorrhea, tinnitus, dizziness, imbalance, history of falls, history of head trauma, and history of ear surgery    Date: 6/26/2024     IMPRESSIONS:      Today's results revealed asymmetric sensorineural hearing loss, right ear worse than the left, with. Excellent speech understanding when in quiet, bilaterally. Tympanometry indicates normal middle ear function. Discussed test results and implications with patient. Discussed benefits of amplification pending medical clearance. Recommended consult with ENT physician due to noted history of significant right asymmetric sensorineural hearing loss .    ASSESSMENT AND FINDINGS:     Otoscopy unremarkable.    RIGHT EAR:  Hearing Sensitivity: Mild sloping to moderate sensorineural hearing loss with significant asymmetries from 250-4000Hz.  Speech Recognition Threshold: 45 dB HL  Word Recognition: Excellent 92%, based on NU-6 25-word list at 85 dBHL using recorded speech stimuli.    Tympanometry: Normal peak pressure and compliance, Type A tympanogram, consistent with

## 2024-07-24 ENCOUNTER — OFFICE VISIT (OUTPATIENT)
Dept: ENT CLINIC | Age: 54
End: 2024-07-24
Payer: COMMERCIAL

## 2024-07-24 ENCOUNTER — PROCEDURE VISIT (OUTPATIENT)
Dept: AUDIOLOGY | Age: 54
End: 2024-07-24

## 2024-07-24 VITALS
WEIGHT: 200 LBS | TEMPERATURE: 98.1 F | SYSTOLIC BLOOD PRESSURE: 136 MMHG | OXYGEN SATURATION: 96 % | DIASTOLIC BLOOD PRESSURE: 90 MMHG | BODY MASS INDEX: 29.62 KG/M2 | HEART RATE: 71 BPM | HEIGHT: 69 IN

## 2024-07-24 DIAGNOSIS — H90.3 ASYMMETRICAL SENSORINEURAL HEARING LOSS: Primary | ICD-10-CM

## 2024-07-24 DIAGNOSIS — H90.3 SENSORINEURAL HEARING LOSS, BILATERAL: Primary | ICD-10-CM

## 2024-07-24 PROCEDURE — 99203 OFFICE O/P NEW LOW 30 MIN: CPT | Performed by: OTOLARYNGOLOGY

## 2024-07-24 RX ORDER — LISINOPRIL 10 MG/1
10 TABLET ORAL DAILY
COMMUNITY
Start: 2024-07-12 | End: 2025-01-08

## 2024-07-24 RX ORDER — METHOCARBAMOL 500 MG/1
500 TABLET, FILM COATED ORAL 2 TIMES DAILY PRN
COMMUNITY
Start: 2024-06-06

## 2024-07-24 RX ORDER — UMECLIDINIUM BROMIDE AND VILANTEROL TRIFENATATE 62.5; 25 UG/1; UG/1
1 POWDER RESPIRATORY (INHALATION) DAILY
COMMUNITY
Start: 2024-07-12

## 2024-07-24 RX ORDER — ALBUTEROL SULFATE 90 UG/1
2 AEROSOL, METERED RESPIRATORY (INHALATION) EVERY 4 HOURS PRN
COMMUNITY
Start: 2024-06-06 | End: 2025-06-06

## 2024-07-24 RX ORDER — SUMATRIPTAN 50 MG/1
TABLET, FILM COATED ORAL
COMMUNITY

## 2024-07-24 ASSESSMENT — ENCOUNTER SYMPTOMS
RHINORRHEA: 0
SINUS PAIN: 0
SORE THROAT: 0

## 2024-07-24 NOTE — PATIENT INSTRUCTIONS
I recommend an MRI scan of the IACs/brain, with contrast, to rule out a vestibular schwannoma (\"acoustic neuroma\") or central nervous system disease as the reason for your unilateral or asymmetric sensorineural hearing loss, tinnitus, or dizziness.  There are adverse consequences of untreated acoustic neuroma, i.e. Total loss of hearing, paralysis of the face, permanent dizziness or pressure on the brain.  This may occur suddenly due to bleeding into the tumor or sudden growth.  Please call for the results of your MRI scan 7 days after the study is done.  You should proceed with amplification therapy, hearing aids, if you are having difficulty communicating and/or hearing important sounds.  You may follow up with the [facility] audiologist or with another hearing aid provider of your choice.  You should have an annual hearing test to monitor your hearing, and whenever your hearing further decreases significantly.    You should return if your ears plug up with ear wax causing difficulty hearing or pressure.  Most patients who use hearing aids, will need their ears cleaned every 6 months.  You may employ the tinnitus masking techniques and strategies we discussed, as needed.  Bedside tinnitus masking devices (eg. a white noise machine, noise machine, noise eliecer on your cell phone, fan on in the room, radio with light music or tuned between stations to white noise static) can be very helpful.    You should avoid exposure to excessively high levels of noise/sound and use hearing protection measures we discussed, as needed, if such exposure is unavoidable.  You may use Lipoflavonoid Plus, (use brand name, not generic, for the first six months), for treatment of your tinnitus.  This is available \"over the counter\" at your pharmacy.  You should take two orally three times a day for the first 60 days, then one orally three times a day thereafter.  Take this medication continuously for six months.  If you have seen no improvement

## 2024-07-24 NOTE — PROGRESS NOTES
the reason for your unilateral or asymmetric sensorineural hearing loss, tinnitus, or dizziness.  There are adverse consequences of untreated acoustic neuroma, i.e. Total loss of hearing, paralysis of the face, permanent dizziness or pressure on the brain.  This may occur suddenly due to bleeding into the tumor or sudden growth.  Please call for the results of your MRI scan 7 days after the study is done.  You should proceed with amplification therapy, hearing aids, if you are having difficulty communicating and/or hearing important sounds.  You may follow up with the [facility] audiologist or with another hearing aid provider of your choice.  You should have an annual hearing test to monitor your hearing, and whenever your hearing further decreases significantly.    You should return if your ears plug up with ear wax causing difficulty hearing or pressure.  Most patients who use hearing aids, will need their ears cleaned every 6 months.  You may employ the tinnitus masking techniques and strategies we discussed, as needed.  Bedside tinnitus masking devices (eg. a white noise machine, noise machine, noise eliecer on your cell phone, fan on in the room, radio with light music or tuned between stations to white noise static) can be very helpful.    You should avoid exposure to excessively high levels of noise/sound and use hearing protection measures we discussed, as needed, if such exposure is unavoidable.  You may use Lipoflavonoid Plus, (use brand name, not generic, for the first six months), for treatment of your tinnitus.  This is available \"over the counter\" at your pharmacy.  You should take two orally three times a day for the first 60 days, then one orally three times a day thereafter.  Take this medication continuously for six months.  If you have seen no improvement in your tinnitus after a full six months of use, you should consider cessation of this treatment.   NO Q-TIPS IN THE EARS  You should never clean your

## 2024-07-24 NOTE — PROGRESS NOTES
Eh Price  1970.53 y.o. male   [id number]      HEARING AID EVALUATION    Eh Price was seen today, 7/24/2024 , for a Hearing Aid Evaluation following audiologic evaluation on 6/26/24.   Patient previously wore a right Phonak hearing aid and perceived benefit with this device  Patient was found to have an insurance benefit for hearing aids (20% coinsurance/80% covered. Explained to patient he will likely obtain contract allowable amount of $2,020 as max amount once out of pocket has been met). Patient is aware he has not met his out of pocket, but would like to move forward with devices. He says he has future medical expenses that will help him reach his out of pocket amount. Patient is responsible for any cost over the previously listed benefit (if any).  Hearing aid benefit worksheet scanned into media tab.      DATE: 7/24/2024     PROCEDURES:     REVIEWED AUDIOGRAM AND HEARING EVALUATION RESULTS:         LIFESTYLE EVALUATION:      Patient's primary concerns are hearing a group of people, TV, mishearing things at work, and putting his phone on speaker. He often will not hear things because he does not want to put the effort into listening at times. 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    After a discussion of evaluation results, discussion of levels of technology and prices, and lifestyle assessment, Eh Price has decided to pursue hearing aids.     Technology to be ordered: Phonak L90-RL.  Picked color P6,  power 2M, large open and medium vent dome, AU. Signed purchase agreement.    Patient cost due at time of fitting: $2880 of total $4000 to billed to insurance (contract allowable amount for [employer] estimated around ~$2,020.00).     PATIENT EDUCATION:      - Verbally reviewed benefits and limitations of devices and available features in hearing aids.    - Verbally discussed realistic expectations of hearing

## 2024-08-09 ENCOUNTER — PATIENT MESSAGE (OUTPATIENT)
Dept: ENT CLINIC | Age: 54
End: 2024-08-09

## 2024-08-09 ENCOUNTER — HOSPITAL ENCOUNTER (OUTPATIENT)
Dept: MRI IMAGING | Age: 54
Discharge: HOME | End: 2024-08-09
Attending: OTOLARYNGOLOGY
Payer: COMMERCIAL

## 2024-08-09 DIAGNOSIS — F41.9 ANXIETY: Primary | ICD-10-CM

## 2024-08-09 DIAGNOSIS — H90.3 ASYMMETRICAL SENSORINEURAL HEARING LOSS: ICD-10-CM

## 2024-08-09 LAB
ANION GAP SERPL CALCULATED.3IONS-SCNC: 12 MMOL/L (ref 3–16)
BUN SERPL-MCNC: 15 MG/DL (ref 7–20)
CALCIUM SERPL-MCNC: 9.5 MG/DL (ref 8.3–10.6)
CHLORIDE SERPL-SCNC: 103 MMOL/L (ref 99–110)
CO2 SERPL-SCNC: 24 MMOL/L (ref 21–32)
CREAT SERPL-MCNC: 1.1 MG/DL (ref 0.9–1.3)
GFR SERPLBLD CREATININE-BSD FMLA CKD-EPI: 79 ML/MIN/{1.73_M2}
GLUCOSE SERPL-MCNC: 86 MG/DL (ref 70–99)
POTASSIUM SERPL-SCNC: 4.1 MMOL/L (ref 3.5–5.1)
SODIUM SERPL-SCNC: 139 MMOL/L (ref 136–145)

## 2024-08-09 PROCEDURE — 36415 COLL VENOUS BLD VENIPUNCTURE: CPT

## 2024-08-09 PROCEDURE — 80048 BASIC METABOLIC PNL TOTAL CA: CPT

## 2024-08-09 NOTE — TELEPHONE ENCOUNTER
From: Eh Price  To: Dr. Vikram Austin  Sent: 8/9/2024 10:14 AM EDT  Subject: MRI you ordered    I went in this morning for the MRI you ordered. Unfortunately, it didn’t go well. I panicked in the scanner and was unable to complete the test.   I rescheduled for two weeks from now in the open scanner, but I wanted to check with you about the possibility of having medication prescribed that can help me get through this.   Please let me know.

## 2024-08-20 RX ORDER — DIAZEPAM 10 MG/1
10 TABLET ORAL ONCE
Qty: 1 TABLET | Refills: 0 | Status: SHIPPED | OUTPATIENT
Start: 2024-08-20 | End: 2024-08-20

## 2024-08-22 ENCOUNTER — HOSPITAL ENCOUNTER (OUTPATIENT)
Dept: MRI IMAGING | Age: 54
Discharge: HOME | End: 2024-08-22
Attending: OTOLARYNGOLOGY
Payer: COMMERCIAL

## 2024-08-22 DIAGNOSIS — H90.3 ASYMMETRICAL SENSORINEURAL HEARING LOSS: ICD-10-CM

## 2024-08-22 PROCEDURE — 70553 MRI BRAIN STEM W/O & W/DYE: CPT

## 2024-08-22 PROCEDURE — A9579 GAD-BASE MR CONTRAST NOS,1ML: HCPCS | Performed by: OTOLARYNGOLOGY

## 2024-08-22 PROCEDURE — 6360000004 HC RX CONTRAST MEDICATION: Performed by: OTOLARYNGOLOGY

## 2024-08-22 RX ADMIN — GADOTERIDOL 19 ML: 279.3 INJECTION, SOLUTION INTRAVENOUS at 15:42

## 2024-08-25 ENCOUNTER — TELEPHONE (OUTPATIENT)
Dept: ENT CLINIC | Age: 54
End: 2024-08-25

## 2024-08-26 NOTE — TELEPHONE ENCOUNTER
Please call patient advise him that the MRI scan of the internal auditory canals and brain was normal with no evidence of vestibular schwannoma or other intracranial lesion or abnormality.      RECOMMENDATIONS/PLAN      Return for any further ENT or sinus problems or symptoms.

## 2024-09-09 ENCOUNTER — PATIENT MESSAGE (OUTPATIENT)
Dept: ENT CLINIC | Age: 54
End: 2024-09-09

## 2024-09-11 ENCOUNTER — PROCEDURE VISIT (OUTPATIENT)
Dept: AUDIOLOGY | Age: 54
End: 2024-09-11

## 2024-09-11 DIAGNOSIS — H90.3 SENSORINEURAL HEARING LOSS, BILATERAL: Primary | ICD-10-CM

## 2024-10-03 ENCOUNTER — OFFICE VISIT (OUTPATIENT)
Dept: ENT CLINIC | Age: 54
End: 2024-10-03
Payer: COMMERCIAL

## 2024-10-03 ENCOUNTER — PROCEDURE VISIT (OUTPATIENT)
Dept: AUDIOLOGY | Age: 54
End: 2024-10-03

## 2024-10-03 VITALS
OXYGEN SATURATION: 98 % | BODY MASS INDEX: 29.62 KG/M2 | WEIGHT: 200 LBS | SYSTOLIC BLOOD PRESSURE: 129 MMHG | TEMPERATURE: 97.1 F | DIASTOLIC BLOOD PRESSURE: 84 MMHG | HEART RATE: 73 BPM | HEIGHT: 69 IN

## 2024-10-03 DIAGNOSIS — J34.2 NASAL SEPTAL DEVIATION: Primary | Chronic | ICD-10-CM

## 2024-10-03 DIAGNOSIS — H90.3 SENSORINEURAL HEARING LOSS, BILATERAL: Primary | ICD-10-CM

## 2024-10-03 DIAGNOSIS — R09.81 NASAL CONGESTION: Chronic | ICD-10-CM

## 2024-10-03 DIAGNOSIS — J30.2 SEASONAL ALLERGIES: Chronic | ICD-10-CM

## 2024-10-03 PROCEDURE — 99213 OFFICE O/P EST LOW 20 MIN: CPT | Performed by: OTOLARYNGOLOGY

## 2024-10-03 RX ORDER — LORATADINE 10 MG/1
10 CAPSULE, LIQUID FILLED ORAL DAILY
COMMUNITY
Start: 2024-10-03

## 2024-10-03 RX ORDER — FLUTICASONE PROPIONATE 50 MCG
SPRAY, SUSPENSION (ML) NASAL
Qty: 16 G | Refills: 2 | Status: SHIPPED | OUTPATIENT
Start: 2024-10-03

## 2024-10-03 NOTE — PROGRESS NOTES
Eh Price  1970.53 y.o. male   [id number]    HEARING AID CHECK    DEVICE & WARRANTY INFORMATION:     RIGHT EAR: /MODEL: Phonak L90-RL  SN: [id number]  EARMOLD/TUBING/WIRE/DOME: 2M medium vented dome    LEFT EAR: /MODEL: Phonak L90-RL  SN: [id number]  EARMOLD/TUBING/WIRE/DOME: 2M medium vented dome    ACCESSORIES:none  HAF: 9/11/24  WARRANTY: 8/23/27  TRIAL PERIOD ENDS:10/11/24  L&D ELIGIBLE: Yes     BUTTONS: ENABLED  PROGRAMS: ENABLED  PHONE CONNECTIVITY: ENABLED    PROCEDURES:     Eh Price was seen today, 10/3/2024, for a hearing aid check appointment.    Patient noted he has mixed reviews about his devices. Initially, he noted some \"qwerky\" things about his device regarding Bluetooth connectivity. He says sound quality will diminish before receiving the notification and then it will resume once he gets the notification.He says he hoped the sound quality would be better while at work. He notes he can hear the background noise diminishing, but it will not  other voices unless he starts talking to them as if he has to focus the microphones to the people he is trying to talk to.     Went into patient's phone and turned off all sounds and haptics, and raise to wake function.    Discussed ordering X93-Tzfrzc devices and we will do a comparison of sound quality once his new devices arrive. This will be an even exchange as devices were not available at the time of his hearing aid evaluation.    Overall, he does notice benefit from his devices and is eager to see if today's changes will improve sound quality. Also went over potential eliecer adjustments that he can play around with while at work.    Connected devices to fitting software. Increased noise block in all programs and dynamic noise cancellation  Datalogging revealed 8 hours of use per day.    PATIENT EDUCATION:     - Verbally and visually reviewed importance of consistent use and care and maintenance of devices.

## 2024-10-03 NOTE — PROGRESS NOTES
CHIEF COMPLAINT  Chief Complaint   Patient presents with    Nasal deviated septum    Nasal Congestion    Allergic Rhinitis        HISTORY OF PRESENT ILLNESS      Eh Price is a 53 y.o. male who presented today for evaluation and management for nasal congestion and nasal septal deviation.  \"I have trouble breathing through the left side of my nose, for 4 weeks.  In general, I have trouble breathing through my nose with any kind of activity.   I have to breathing through my mouth, sleeping breath through my mouth, for at least 10 years.  Had it corrected once before 20 years ago and was a time had much better nasal breathing and the change was gradual.  But the first time I noticed it was about ten years ago.  Most of the air I get is through the left side.  More trouble breathing through the right side of the nose.\"       \"Left ear started popping two week ago.  Started using Flonase about 10 days ago, which has helped somewhat.    Just breathing through the nose, I get a pop and then amplified sound of air going through ny nose.\"      Started wearing hearing aids about 3 to 4 weeks ago.        REVIEW OF SYSTEMS  Constitutional:  Denied fever and chills.  ENT/sinus:  Denied otalgia, otorrhea, nasal pain, rhinorrhea, sore throat, and sinus/facial pain.        EXAMINATION    Vitals:    10/03/24 0850   BP: 129/84   Site: Left Upper Arm   Position: Sitting   Cuff Size: Medium Adult   Pulse: 73   Temp: 97.1 °F (36.2 °C)   TempSrc: Infrared   SpO2: 98%   Weight: 90.7 kg (200 lb)   Height: 1.74 m (5' 8.5\")     WDWN, NAD  Face:  Normal skin.    Voice:  Normal, with no hoarseness, breathiness, or hot potato quality.  (+)  Ears:   Left TM dull and retracted with no erythema or COLE.  TMs and EACs were normal.  The mastoids and pinnae were normal.    (+) Nose:  NSD rightward anteriorly.  ITH left > right.    Sinuses: Nontender x 4   Throat,  OC/OP:  Normal.     Neck:  NT, No masses.  Trachea midline.     Nodes:  No

## 2024-10-03 NOTE — PATIENT INSTRUCTIONS
EUSTACHIAN TUBE DYSFUNCTION MEASURES    Please do the following to attempt to improve your Eustachian tube dysfunction  and/or to help to prevent fluid in your middle ear:  1.  Auto inflate your ears as instructed ( hold your nose closed, with your mouth closed and blow out as if blowing ear into or out of ears.  If not successful, then swallow while doing the auto inflation maneuver and maintaining the pressure) every three hours, while awake, for ten days, and then four times daily for 10 days, and then three times daily and as needed for ear congestion.     2.  Take one Mucinex (blue box) maximum strength 1200 mg tablet (or two 600 regular strength tablets)every 12 hours, daily for the next 14 days. (OTC)      3.  Use 0.05% Oxymetazoline (eg. Afrin, Duration, 4-Way) 12 hour decongestant nasal solution, with two sprays in each nostril three times a day for three days, then twice a day for two days, then stop for two days and then repeat the cycle once.    4.  Use fluticasone nasal spray (generic Flonase), 2 sprays in each nostril once daily.

## 2024-10-23 ENCOUNTER — PROCEDURE VISIT (OUTPATIENT)
Dept: AUDIOLOGY | Age: 54
End: 2024-10-23

## 2024-10-23 DIAGNOSIS — H90.3 SENSORINEURAL HEARING LOSS, BILATERAL: Primary | ICD-10-CM

## 2024-10-24 NOTE — PROGRESS NOTES
Eh Price   1970, 53 y.o. male   [id number]     HEARING AID FITTING      RIGHT EAR: /MODEL: Phonak D60-Kohykh  SN: [id number]  EARMOLD/TUBING/WIRE/DOME: 2M medium vented    LEFT EAR: /MODEL: Phonak T32-Cyoyac  SN: [id number]  EARMOLD/TUBING/WIRE/DOME: 2M  medium vented  ACCESSORIES:: [id number]  HAF: 10/23/24  WARRANTY: 10/11/27  TRIAL PERIOD ENDS:11/23/24  L&D ELIGIBLE: Yes    BUTTONS: ENABLED  PROGRAMS: ENABLED  PHONE CONNECTIVITY: Connected to Phone and Anastacia    Eh Price was seen today,10/23/2024,  to be fit with Phonak I90-R hearing aids in exchange for previously fit Phonak L90-RL devices from initial fitting on 9/11/24.  Hearing aids were programmed to 100%. Patient's settings from first fitting were transferred into today's hearing aids.      Eh Price noted excellent sound quality.  It was recommended that patient return for a follow-up appointment within the 30-day right-to-return period for further programming adjustments and education of the devices as warranted.    Unpaired and repaired patient's hearing aids with phone and anastacia.    Will send L90-RL devices back to [employer] for credit along with extra I90-R Sphere devices that were ordered by mistake.  PATIENT EDUCATION:       Eh Price was provided with the Hearing Aid Fitting Checklist as a reference of items discussed at today's appointment.  Patient may find additional product information in the provided hearing aid  device manual.      Unbundled Billing- see associated fees and codes below:       Description Code Amount   Hearing Aids  $4476.50   Dispensing Fee  $300   Earmold Non-Custom   $24.25x2 = $48.50   Conformity Evaluation   (Real-Ear Measurements)  $75.00     Patient paid $0. Even exchange performed. Patient previously paid $4900.    RECOMMENDATIONS:     Return for follow-up appointment within 30-day right-to-return period.  HAC as needed, patient prefers

## 2024-10-30 ENCOUNTER — TELEPHONE (OUTPATIENT)
Dept: ENT CLINIC | Age: 54
End: 2024-10-30

## 2024-10-30 NOTE — TELEPHONE ENCOUNTER
A rep from [facility] called regarding hearing aids they received for this pt, she stated she did not receive any paperwork and would like for you to give them a call. [phone] option [phone].

## 2024-11-01 ENCOUNTER — APPOINTMENT (OUTPATIENT)
Dept: GENERAL RADIOLOGY | Age: 54
End: 2024-11-01
Payer: COMMERCIAL

## 2024-11-01 ENCOUNTER — HOSPITAL ENCOUNTER (INPATIENT)
Age: 54
LOS: 6 days | Discharge: HOME | End: 2024-11-08
Attending: INTERNAL MEDICINE | Admitting: INTERNAL MEDICINE
Payer: COMMERCIAL

## 2024-11-01 ENCOUNTER — APPOINTMENT (OUTPATIENT)
Dept: CT IMAGING | Age: 54
End: 2024-11-01
Payer: COMMERCIAL

## 2024-11-01 DIAGNOSIS — G98.8 NEUROLOGIC DISORDER: Primary | ICD-10-CM

## 2024-11-01 LAB
25(OH)D3 SERPL-MCNC: 40.8 NG/ML
ALBUMIN SERPL-MCNC: 4.4 G/DL (ref 3.4–5)
ALP SERPL-CCNC: 79 U/L (ref 40–129)
ALT SERPL-CCNC: 18 U/L (ref 10–40)
ANION GAP SERPL CALCULATED.3IONS-SCNC: 13 MMOL/L (ref 3–16)
AST SERPL-CCNC: 29 U/L (ref 15–37)
BACTERIA URNS QL MICRO: NORMAL /HPF
BASE EXCESS BLDV CALC-SCNC: 0.3 MMOL/L
BASOPHILS # BLD: 0.1 K/UL (ref 0–0.2)
BASOPHILS NFR BLD: 0.9 %
BILIRUB DIRECT SERPL-MCNC: 0.2 MG/DL (ref 0–0.3)
BILIRUB INDIRECT SERPL-MCNC: 0.3 MG/DL (ref 0–1)
BILIRUB SERPL-MCNC: 0.5 MG/DL (ref 0–1)
BILIRUB UR QL STRIP.AUTO: NEGATIVE
BUN SERPL-MCNC: 18 MG/DL (ref 7–20)
CA-I BLD-SCNC: 1.37 MMOL/L (ref 1.12–1.32)
CALCIUM SERPL-MCNC: 10.4 MG/DL (ref 8.3–10.6)
CHLORIDE SERPL-SCNC: 106 MMOL/L (ref 99–110)
CLARITY UR: CLEAR
CO2 BLDV-SCNC: 26 MMOL/L
CO2 SERPL-SCNC: 23 MMOL/L (ref 21–32)
COHGB MFR BLDV: 1.6 %
COLOR UR: YELLOW
CREAT SERPL-MCNC: 1 MG/DL (ref 0.9–1.3)
CRP SERPL-MCNC: <3 MG/L (ref 0–5.1)
DEPRECATED RDW RBC AUTO: 15.5 % (ref 12.4–15.4)
EOSINOPHIL # BLD: 0.3 K/UL (ref 0–0.6)
EOSINOPHIL NFR BLD: 3.8 %
EPI CELLS #/AREA URNS AUTO: 0 /HPF (ref 0–5)
ERYTHROCYTE [SEDIMENTATION RATE] IN BLOOD BY WESTERGREN METHOD: 32 MM/HR (ref 0–20)
FOLATE SERPL-MCNC: 15 NG/ML (ref 4.78–24.2)
GFR SERPLBLD CREATININE-BSD FMLA CKD-EPI: 88 ML/MIN/{1.73_M2}
GLUCOSE SERPL-MCNC: 104 MG/DL (ref 70–99)
GLUCOSE UR STRIP.AUTO-MCNC: NEGATIVE MG/DL
HAV IGM SERPL QL IA: NORMAL
HBV CORE IGM SERPL QL IA: NORMAL
HBV SURFACE AG SERPL QL IA: NORMAL
HCO3 BLDV-SCNC: 25 MMOL/L (ref 23–29)
HCT VFR BLD AUTO: 45.4 % (ref 40.5–52.5)
HCV AB SERPL QL IA: NORMAL
HGB BLD-MCNC: 15.4 G/DL (ref 13.5–17.5)
HGB UR QL STRIP.AUTO: NEGATIVE
HYALINE CASTS #/AREA URNS AUTO: 0 /LPF (ref 0–8)
INR PPP: 1.14 (ref 0.85–1.15)
KETONES UR STRIP.AUTO-MCNC: 15 MG/DL
LEUKOCYTE ESTERASE UR QL STRIP.AUTO: NEGATIVE
LYMPHOCYTES # BLD: 2.8 K/UL (ref 1–5.1)
LYMPHOCYTES NFR BLD: 31.1 %
MAGNESIUM SERPL-MCNC: 2.01 MG/DL (ref 1.8–2.4)
MCH RBC QN AUTO: 29.1 PG (ref 26–34)
MCHC RBC AUTO-ENTMCNC: 34 G/DL (ref 31–36)
MCV RBC AUTO: 85.6 FL (ref 80–100)
METHGB MFR BLDV: 0.4 %
MONOCYTES # BLD: 1 K/UL (ref 0–1.3)
MONOCYTES NFR BLD: 10.7 %
NEUTROPHILS # BLD: 4.8 K/UL (ref 1.7–7.7)
NEUTROPHILS NFR BLD: 53.5 %
NITRITE UR QL STRIP.AUTO: NEGATIVE
O2 THERAPY: ABNORMAL
PCO2 BLDV: 38.7 MMHG (ref 40–50)
PH BLDV: 7.41 [PH] (ref 7.35–7.45)
PH BLDV: 7.42 [PH] (ref 7.35–7.45)
PH UR STRIP.AUTO: 5.5 [PH] (ref 5–8)
PLATELET # BLD AUTO: 303 K/UL (ref 135–450)
PMV BLD AUTO: 8.5 FL (ref 5–10.5)
PO2 BLDV: 45 MMHG
POTASSIUM SERPL-SCNC: 3.9 MMOL/L (ref 3.5–5.1)
PROCALCITONIN SERPL IA-MCNC: 0.03 NG/ML (ref 0–0.15)
PROT SERPL-MCNC: 7.8 G/DL (ref 6.4–8.2)
PROT UR STRIP.AUTO-MCNC: ABNORMAL MG/DL
PROTHROMBIN TIME: 14.8 SEC (ref 11.9–14.9)
PTH-INTACT SERPL-MCNC: 55.5 PG/ML (ref 14–72)
RBC # BLD AUTO: 5.31 M/UL (ref 4.2–5.9)
RBC CLUMPS #/AREA URNS AUTO: 1 /HPF (ref 0–4)
SAO2 % BLDV: 81 %
SODIUM SERPL-SCNC: 142 MMOL/L (ref 136–145)
SP GR UR STRIP.AUTO: 1.06 (ref 1–1.03)
T4 FREE SERPL-MCNC: 1.5 NG/DL (ref 0.9–1.8)
TSH SERPL DL<=0.005 MIU/L-ACNC: 2.2 UIU/ML (ref 0.27–4.2)
UA COMPLETE W REFLEX CULTURE PNL UR: ABNORMAL
UA DIPSTICK W REFLEX MICRO PNL UR: YES
URN SPEC COLLECT METH UR: ABNORMAL
UROBILINOGEN UR STRIP-ACNC: 0.2 E.U./DL
VIT B12 SERPL-MCNC: 432 PG/ML (ref 211–911)
WBC # BLD AUTO: 9 K/UL (ref 4–11)
WBC #/AREA URNS AUTO: 0 /HPF (ref 0–5)

## 2024-11-01 PROCEDURE — 81001 URINALYSIS AUTO W/SCOPE: CPT

## 2024-11-01 PROCEDURE — 70496 CT ANGIOGRAPHY HEAD: CPT

## 2024-11-01 PROCEDURE — 99285 EMERGENCY DEPT VISIT HI MDM: CPT

## 2024-11-01 PROCEDURE — 85652 RBC SED RATE AUTOMATED: CPT

## 2024-11-01 PROCEDURE — 71045 X-RAY EXAM CHEST 1 VIEW: CPT

## 2024-11-01 PROCEDURE — 87390 HIV-1 AG IA: CPT

## 2024-11-01 PROCEDURE — 93005 ELECTROCARDIOGRAM TRACING: CPT | Performed by: PHYSICIAN ASSISTANT

## 2024-11-01 PROCEDURE — 80048 BASIC METABOLIC PNL TOTAL CA: CPT

## 2024-11-01 PROCEDURE — 85025 COMPLETE CBC W/AUTO DIFF WBC: CPT

## 2024-11-01 PROCEDURE — 83970 ASSAY OF PARATHORMONE: CPT

## 2024-11-01 PROCEDURE — 84155 ASSAY OF PROTEIN SERUM: CPT

## 2024-11-01 PROCEDURE — 86701 HIV-1ANTIBODY: CPT

## 2024-11-01 PROCEDURE — 70450 CT HEAD/BRAIN W/O DYE: CPT

## 2024-11-01 PROCEDURE — 0064U ANTB TP TOTAL&RPR IA QUAL: CPT

## 2024-11-01 PROCEDURE — 86038 ANTINUCLEAR ANTIBODIES: CPT

## 2024-11-01 PROCEDURE — 83825 ASSAY OF MERCURY: CPT

## 2024-11-01 PROCEDURE — 82306 VITAMIN D 25 HYDROXY: CPT

## 2024-11-01 PROCEDURE — 82746 ASSAY OF FOLIC ACID SERUM: CPT

## 2024-11-01 PROCEDURE — 84439 ASSAY OF FREE THYROXINE: CPT

## 2024-11-01 PROCEDURE — 80076 HEPATIC FUNCTION PANEL: CPT

## 2024-11-01 PROCEDURE — 86140 C-REACTIVE PROTEIN: CPT

## 2024-11-01 PROCEDURE — 84165 PROTEIN E-PHORESIS SERUM: CPT

## 2024-11-01 PROCEDURE — 86617 LYME DISEASE ANTIBODY: CPT

## 2024-11-01 PROCEDURE — 82330 ASSAY OF CALCIUM: CPT

## 2024-11-01 PROCEDURE — 36415 COLL VENOUS BLD VENIPUNCTURE: CPT

## 2024-11-01 PROCEDURE — 86702 HIV-2 ANTIBODY: CPT

## 2024-11-01 PROCEDURE — 80074 ACUTE HEPATITIS PANEL: CPT

## 2024-11-01 PROCEDURE — 82803 BLOOD GASES ANY COMBINATION: CPT

## 2024-11-01 PROCEDURE — 82175 ASSAY OF ARSENIC: CPT

## 2024-11-01 PROCEDURE — 84145 PROCALCITONIN (PCT): CPT

## 2024-11-01 PROCEDURE — 84443 ASSAY THYROID STIM HORMONE: CPT

## 2024-11-01 PROCEDURE — 82607 VITAMIN B-12: CPT

## 2024-11-01 PROCEDURE — 83735 ASSAY OF MAGNESIUM: CPT

## 2024-11-01 PROCEDURE — 83655 ASSAY OF LEAD: CPT

## 2024-11-01 PROCEDURE — 6360000004 HC RX CONTRAST MEDICATION: Performed by: PHYSICIAN ASSISTANT

## 2024-11-01 PROCEDURE — 85610 PROTHROMBIN TIME: CPT

## 2024-11-01 RX ORDER — IOPAMIDOL 755 MG/ML
75 INJECTION, SOLUTION INTRAVASCULAR
Status: COMPLETED | OUTPATIENT
Start: 2024-11-01 | End: 2024-11-01

## 2024-11-01 RX ORDER — PHENOL 1.4 %
1 AEROSOL, SPRAY (ML) MUCOUS MEMBRANE DAILY
COMMUNITY

## 2024-11-01 RX ADMIN — IOPAMIDOL 75 ML: 755 INJECTION, SOLUTION INTRAVENOUS at 18:57

## 2024-11-01 ASSESSMENT — PAIN DESCRIPTION - DESCRIPTORS: DESCRIPTORS: ACHING

## 2024-11-01 ASSESSMENT — PAIN - FUNCTIONAL ASSESSMENT: PAIN_FUNCTIONAL_ASSESSMENT: 0-10

## 2024-11-01 ASSESSMENT — PAIN DESCRIPTION - FREQUENCY: FREQUENCY: CONTINUOUS

## 2024-11-01 ASSESSMENT — PAIN DESCRIPTION - LOCATION: LOCATION: HAND;NECK

## 2024-11-01 ASSESSMENT — PAIN SCALES - GENERAL: PAINLEVEL_OUTOF10: 3

## 2024-11-01 ASSESSMENT — PAIN DESCRIPTION - PAIN TYPE: TYPE: ACUTE PAIN

## 2024-11-01 ASSESSMENT — PAIN DESCRIPTION - ORIENTATION: ORIENTATION: RIGHT

## 2024-11-01 NOTE — ED PROVIDER NOTES
[facility]  EMERGENCY DEPARTMENT ENCOUNTER        Pt Name: Eh Price  MRN: [id number]  Birthdate 1970  Date of evaluation: 11/1/2024  Provider: Dipak Santana PA-C  PCP: Ernestina Anne PA-C  Note Started: 5:19 PM EDT 11/1/24      DASH. I have evaluated this patient.        CHIEF COMPLAINT       Chief Complaint   Patient presents with    Neurologic Problem     Pt states his doctor sent him here because he has been having neurological problems lately- muscle spasms/clenching, R vision blurriness/changes, confusion, headaches. Pt states this all started in march when he was admitted for complex migraines, but in July it started to get worse. Pt states he gets a numbness/tingling sensation in back on his neck when he bends his head downward. Denies chest pain, n/v, SOB. Pt states he has pain in R hand and neck at the moment. Pt A&O x4 and gait steady in triage       HISTORY OF PRESENT ILLNESS: 1 or more Elements     History From: Patient    Eh Price is a 53 y.o. male who presents to the emergency department at request of PCP.  PCP contacts ED and provides information.  This patient presenting with neurologic symptoms of unclear etiology.  The patient symptoms began March, 2024.    March, 2024 the patient met at this facility with a left body paresthesia and numbness.  He was ultimately admitted and had studies to include CT head, CTA head and neck and MRI.  None of these showed any concerning pathology such as CVA.  Patient ultimately discharged.    The patient reports over the past 3 months or beginning July, 2024 he is noted muscle tension, muscle cramps, muscle spasm which can be induced by gripping something firmly with may occur spontaneously.  These are increasing in frequency.  In fact, the patient states he is experiencing nocturnal cramping at random muscular groups of his body.  He does indicate sometimes walking is difficult due to spasm of his feet with upgoing toe and downgoing  Base) MCG/ACT inhaler Inhale 2 puffs into the lungs every 4 hours as needed      ANORO ELLIPTA 62.5-25 MCG/ACT inhaler Inhale 1 puff into the lungs daily      SUMAtriptan (IMITREX) 50 MG tablet TAKE 1 TABLET (50 MG TOTAL) BY MOUTH IF NEEDED FOR MIGRAINE.      methocarbamol (ROBAXIN) 500 MG tablet Take 1 tablet by mouth 2 times daily as needed      omeprazole (PRILOSEC) 40 MG delayed release capsule Take 1 capsule by mouth daily             ALLERGIES     Paxil [paroxetine hcl]    FAMILYHISTORY       Family History   Problem Relation Age of Onset    Diabetes type 2  Father     Diabetes type 2  Paternal Grandfather     Prostate Cancer Paternal Grandfather         SOCIAL HISTORY       Social History     Tobacco Use    Smoking status: Former     Current packs/day: 0.50     Average packs/day: 0.5 packs/day for 9.7 years (4.8 ttl pk-yrs)     Types: Cigarettes     Start date: 3/1/2015    Smokeless tobacco: Never   Vaping Use    Vaping status: Never Used   Substance Use Topics    Alcohol use: Not Currently    Drug use: Never       SCREENINGS          Rosenhayn Coma Scale  Eye Opening: Spontaneous  Best Verbal Response: Oriented  Best Motor Response: Obeys commands  Rosenhayn Coma Scale Score: 15                        CIWA Assessment  BP: (!) 148/90  Pulse: 74             PHYSICAL EXAM  1 or more Elements     ED Triage Vitals [11/01/24 1601]   BP Systolic BP Percentile Diastolic BP Percentile Temp Temp Source Pulse Respirations SpO2   (!) 149/90 -- -- 97.7 °F (36.5 °C) Oral 84 16 98 %      Height Weight - Scale         1.753 m (5' 9\") 87.9 kg (193 lb 12.6 oz)             Physical Exam  Vitals and nursing note reviewed.   Constitutional:       Appearance: Normal appearance. He is well-developed and normal weight.   HENT:      Head: Normocephalic and atraumatic.      Right Ear: External ear normal.      Left Ear: External ear normal.      Nose: Nose normal.      Mouth/Throat:      Mouth: Mucous membranes are moist.      Pharynx:

## 2024-11-02 PROBLEM — G25.3 MYOCLONIC JERKING: Status: ACTIVE | Noted: 2024-11-02

## 2024-11-02 PROBLEM — I10 PRIMARY HYPERTENSION: Status: ACTIVE | Noted: 2024-11-02

## 2024-11-02 LAB
ANA SER QL IA: NEGATIVE
EKG ATRIAL RATE: 74 BPM
EKG DIAGNOSIS: NORMAL
EKG P AXIS: 75 DEGREES
EKG P-R INTERVAL: 166 MS
EKG Q-T INTERVAL: 400 MS
EKG QRS DURATION: 94 MS
EKG QTC CALCULATION (BAZETT): 444 MS
EKG R AXIS: -32 DEGREES
EKG T AXIS: 65 DEGREES
EKG VENTRICULAR RATE: 74 BPM
HIV 1+2 AB+HIV1 P24 AG SERPL QL IA: NORMAL
HIV 2 AB SERPL QL IA: NORMAL
HIV1 AB SERPL QL IA: NORMAL
HIV1 P24 AG SERPL QL IA: NORMAL
REAGIN+T PALLIDUM IGG+IGM SERPL-IMP: REACTIVE
RPR SER QL: NORMAL

## 2024-11-02 PROCEDURE — 94640 AIRWAY INHALATION TREATMENT: CPT

## 2024-11-02 PROCEDURE — 84425 ASSAY OF VITAMIN B-1: CPT

## 2024-11-02 PROCEDURE — 2580000003 HC RX 258: Performed by: INTERNAL MEDICINE

## 2024-11-02 PROCEDURE — 94760 N-INVAS EAR/PLS OXIMETRY 1: CPT

## 2024-11-02 PROCEDURE — 93010 ELECTROCARDIOGRAM REPORT: CPT | Performed by: INTERNAL MEDICINE

## 2024-11-02 PROCEDURE — 6370000000 HC RX 637 (ALT 250 FOR IP): Performed by: INTERNAL MEDICINE

## 2024-11-02 PROCEDURE — 1200000000 HC SEMI PRIVATE

## 2024-11-02 PROCEDURE — 36415 COLL VENOUS BLD VENIPUNCTURE: CPT

## 2024-11-02 PROCEDURE — 009U3ZX DRAINAGE OF SPINAL CANAL, PERCUTANEOUS APPROACH, DIAGNOSTIC: ICD-10-PCS

## 2024-11-02 PROCEDURE — 6360000002 HC RX W HCPCS: Performed by: INTERNAL MEDICINE

## 2024-11-02 RX ORDER — SODIUM CHLORIDE 0.9 % (FLUSH) 0.9 %
5-40 SYRINGE (ML) INJECTION PRN
Status: DISCONTINUED | OUTPATIENT
Start: 2024-11-02 | End: 2024-11-08 | Stop reason: HOSPADM

## 2024-11-02 RX ORDER — LORAZEPAM 1 MG/1
1 TABLET ORAL ONCE
Status: COMPLETED | OUTPATIENT
Start: 2024-11-02 | End: 2024-11-05

## 2024-11-02 RX ORDER — SODIUM CHLORIDE 0.9 % (FLUSH) 0.9 %
5-40 SYRINGE (ML) INJECTION EVERY 12 HOURS SCHEDULED
Status: DISCONTINUED | OUTPATIENT
Start: 2024-11-02 | End: 2024-11-08 | Stop reason: HOSPADM

## 2024-11-02 RX ORDER — ONDANSETRON 2 MG/ML
4 INJECTION INTRAMUSCULAR; INTRAVENOUS EVERY 6 HOURS PRN
Status: DISCONTINUED | OUTPATIENT
Start: 2024-11-02 | End: 2024-11-08 | Stop reason: HOSPADM

## 2024-11-02 RX ORDER — MAGNESIUM SULFATE IN WATER 40 MG/ML
2000 INJECTION, SOLUTION INTRAVENOUS PRN
Status: DISCONTINUED | OUTPATIENT
Start: 2024-11-02 | End: 2024-11-08 | Stop reason: HOSPADM

## 2024-11-02 RX ORDER — CETIRIZINE HYDROCHLORIDE 10 MG/1
10 TABLET ORAL DAILY
Status: DISCONTINUED | OUTPATIENT
Start: 2024-11-02 | End: 2024-11-08 | Stop reason: HOSPADM

## 2024-11-02 RX ORDER — POLYETHYLENE GLYCOL 3350 17 G/17G
17 POWDER, FOR SOLUTION ORAL DAILY PRN
Status: DISCONTINUED | OUTPATIENT
Start: 2024-11-02 | End: 2024-11-08 | Stop reason: HOSPADM

## 2024-11-02 RX ORDER — METHOCARBAMOL 500 MG/1
500 TABLET, FILM COATED ORAL 2 TIMES DAILY PRN
Status: DISCONTINUED | OUTPATIENT
Start: 2024-11-02 | End: 2024-11-08 | Stop reason: HOSPADM

## 2024-11-02 RX ORDER — ALBUTEROL SULFATE 90 UG/1
2 INHALANT RESPIRATORY (INHALATION) EVERY 4 HOURS PRN
Status: DISCONTINUED | OUTPATIENT
Start: 2024-11-02 | End: 2024-11-08 | Stop reason: HOSPADM

## 2024-11-02 RX ORDER — LORAZEPAM 2 MG/ML
4 INJECTION INTRAMUSCULAR EVERY 5 MIN PRN
Status: DISCONTINUED | OUTPATIENT
Start: 2024-11-02 | End: 2024-11-08 | Stop reason: HOSPADM

## 2024-11-02 RX ORDER — ACETAMINOPHEN 325 MG/1
650 TABLET ORAL EVERY 6 HOURS PRN
Status: DISCONTINUED | OUTPATIENT
Start: 2024-11-02 | End: 2024-11-08 | Stop reason: HOSPADM

## 2024-11-02 RX ORDER — ENOXAPARIN SODIUM 100 MG/ML
40 INJECTION SUBCUTANEOUS DAILY
Status: DISCONTINUED | OUTPATIENT
Start: 2024-11-02 | End: 2024-11-08 | Stop reason: HOSPADM

## 2024-11-02 RX ORDER — LORAZEPAM 2 MG/ML
1 INJECTION INTRAMUSCULAR ONCE
Status: DISCONTINUED | OUTPATIENT
Start: 2024-11-02 | End: 2024-11-02

## 2024-11-02 RX ORDER — POTASSIUM CHLORIDE 1500 MG/1
40 TABLET, EXTENDED RELEASE ORAL PRN
Status: DISCONTINUED | OUTPATIENT
Start: 2024-11-02 | End: 2024-11-08 | Stop reason: HOSPADM

## 2024-11-02 RX ORDER — POTASSIUM CHLORIDE 7.45 MG/ML
10 INJECTION INTRAVENOUS PRN
Status: DISCONTINUED | OUTPATIENT
Start: 2024-11-02 | End: 2024-11-08 | Stop reason: HOSPADM

## 2024-11-02 RX ORDER — PANTOPRAZOLE SODIUM 40 MG/1
40 TABLET, DELAYED RELEASE ORAL
Status: DISCONTINUED | OUTPATIENT
Start: 2024-11-02 | End: 2024-11-08 | Stop reason: HOSPADM

## 2024-11-02 RX ORDER — SODIUM CHLORIDE 9 MG/ML
INJECTION, SOLUTION INTRAVENOUS PRN
Status: DISCONTINUED | OUTPATIENT
Start: 2024-11-02 | End: 2024-11-08 | Stop reason: HOSPADM

## 2024-11-02 RX ORDER — ONDANSETRON 4 MG/1
4 TABLET, ORALLY DISINTEGRATING ORAL EVERY 8 HOURS PRN
Status: DISCONTINUED | OUTPATIENT
Start: 2024-11-02 | End: 2024-11-08 | Stop reason: HOSPADM

## 2024-11-02 RX ORDER — FLUTICASONE PROPIONATE 50 MCG
2 SPRAY, SUSPENSION (ML) NASAL DAILY
Status: DISCONTINUED | OUTPATIENT
Start: 2024-11-02 | End: 2024-11-08 | Stop reason: HOSPADM

## 2024-11-02 RX ORDER — ACETAMINOPHEN 650 MG/1
650 SUPPOSITORY RECTAL EVERY 6 HOURS PRN
Status: DISCONTINUED | OUTPATIENT
Start: 2024-11-02 | End: 2024-11-08 | Stop reason: HOSPADM

## 2024-11-02 RX ORDER — LISINOPRIL 10 MG/1
10 TABLET ORAL DAILY
Status: DISCONTINUED | OUTPATIENT
Start: 2024-11-02 | End: 2024-11-08 | Stop reason: HOSPADM

## 2024-11-02 RX ADMIN — PANTOPRAZOLE SODIUM 40 MG: 40 TABLET, DELAYED RELEASE ORAL at 05:54

## 2024-11-02 RX ADMIN — ENOXAPARIN SODIUM 40 MG: 100 INJECTION SUBCUTANEOUS at 08:51

## 2024-11-02 RX ADMIN — METHOCARBAMOL 500 MG: 500 TABLET ORAL at 05:54

## 2024-11-02 RX ADMIN — FLUTICASONE PROPIONATE 2 SPRAY: 50 SPRAY, METERED NASAL at 08:51

## 2024-11-02 RX ADMIN — TIOTROPIUM BROMIDE AND OLODATEROL 2 PUFF: 3.124; 2.736 SPRAY, METERED RESPIRATORY (INHALATION) at 08:38

## 2024-11-02 RX ADMIN — CETIRIZINE HYDROCHLORIDE 10 MG: 10 TABLET, FILM COATED ORAL at 08:51

## 2024-11-02 RX ADMIN — LISINOPRIL 10 MG: 10 TABLET ORAL at 08:51

## 2024-11-02 RX ADMIN — SODIUM CHLORIDE, PRESERVATIVE FREE 10 ML: 5 INJECTION INTRAVENOUS at 08:53

## 2024-11-02 ASSESSMENT — PAIN SCALES - GENERAL: PAINLEVEL_OUTOF10: 0

## 2024-11-02 ASSESSMENT — LIFESTYLE VARIABLES
HOW MANY STANDARD DRINKS CONTAINING ALCOHOL DO YOU HAVE ON A TYPICAL DAY: PATIENT DOES NOT DRINK
HOW OFTEN DO YOU HAVE A DRINK CONTAINING ALCOHOL: NEVER

## 2024-11-02 NOTE — H&P
V2.0  History and Physical      Name:  Eh Price /Age/Sex: 1970  (53 y.o. male)   MRN & CSN:  [id number] & [id number] Encounter Date/Time: 2024 3:44 AM EDT   Location:  [address] [place] [address] PCP: Ernestina Anne PA-C       Hospital Day: 2    Assessment and Plan:   Eh Price is a 53 y.o. male smoker in remission with a pmh of hypertension, GERD, mixed anxiety and depressive disorder migraines, asymmetric sensorineural hearing loss who presents with Myoclonic jerking.    Hospital Problems             Last Modified POA    * (Principal) Myoclonic jerking 2024 Yes    Primary hypertension 2024 Yes       Plan:  MRI of the brain, neurology consult  Resume home regimen for chronic stable conditions    Disposition:   Current Living situation: Home  Expected Disposition: Home  Estimated D/C: 2 - 3 days    Diet ADULT DIET; Regular; Low Fat/Low Chol/High Fiber/JAIME   DVT Prophylaxis [x] Lovenox, []  Heparin, [] SCDs, [] Ambulation,  [] Eliquis, [] Xarelto, [] Coumadin   Code Status Full Code   Surrogate Decision Maker/ POA Mother     Personally reviewed Lab Studies and Imaging     Discussed management of the case with ED provider who recommended admission.    EKG interpreted personally and results normal sinus rhythm with a ventricular rate of 74, QTc of 444, no acute ischemic changes.    Imaging that was interpreted personally includes chest x-ray and results negative for any acute cardiopulmonary process.    Drugs that require monitoring for toxicity include none and the method of monitoring was n/a.        History from:     patient    History of Present Illness:     Chief Complaint: Involuntary jerking followed by spasticity, word finding difficulty, memory lapses, mental fogginess  Eh Price is a 53 y.o. male smoker with pmh of hypertension, GERD, mixed anxiety and depressive disorder migraines, asymmetric sensorineural hearing loss,   who presents with involuntary jerking with associated spasticity,  status: Former     Current packs/day: 0.50     Average packs/day: 0.5 packs/day for 9.7 years (4.8 ttl pk-yrs)     Types: Cigarettes     Start date: 3/1/2015    Smokeless tobacco: Never   Vaping Use    Vaping status: Never Used   Substance and Sexual Activity    Alcohol use: Not Currently    Drug use: Never     Social Determinants of Health     Financial Resource Strain: Low Risk  (6/6/2024)    Received from [employer]    Overall Financial Resource Strain (CARDIA)     Difficulty of Paying Living Expenses: Not hard at all   Food Insecurity: No Food Insecurity (6/6/2024)    Received from [employer]    Hunger Vital Sign     Worried About Running Out of Food in the Last Year: Never true     Ran Out of Food in the Last Year: Never true   Transportation Needs: No Transportation Needs (6/6/2024)    Received from [employer]    PRAPARE - Transportation     Lack of Transportation (Medical): No     Lack of Transportation (Non-Medical): No   Physical Activity: Insufficiently Active (6/6/2024)    Received from [employer]    Exercise Vital Sign     Days of Exercise per Week: 2 days     Minutes of Exercise per Session: 10 min   Stress: No Stress Concern Present (6/6/2024)    Received from [employer]    [employer] [place] [employer] - Occupational Stress Questionnaire     Feeling of Stress : Only a little   Social Connections: Socially Isolated (6/6/2024)    Received from [employer]    Social Connection and Isolation Panel [NHANES]     Frequency of Communication with Friends and Family: More than three times a week     Frequency of Social Gatherings with Friends and Family: Twice a week     Attends Ep[employer] Services: Never     Active Member of Clubs or Organizations: No     Attends Club or Organization Meetings: Patient declined     Marital Status:    Housing Stability: Unknown (6/6/2024)    Received from [employer]    Housing Stability Vital Sign     In the last 12

## 2024-11-02 NOTE — PROGRESS NOTES
Patient is admitted to room 4115, AO X4. Patient denies pain os SOB. Patient is oriented to room, call light is placed within reach.    Tele-monitor applied and verified by [facility]

## 2024-11-02 NOTE — PROGRESS NOTES
MRI not done today.  One time dose of ativan not given.  Will need new order on Monday for ativan before MRI

## 2024-11-02 NOTE — PROGRESS NOTES
Medication Reconciliation    List of medications patient is currently taking is complete.     Source of information: 1. Conversation with patient at bedside                                      2. EPIC records           Notes regarding home medications:   1. Patient brought all his medications with him. Reports taking AM medications PTA.        Faith Pate, Pharmacy Intern   11/1/2024  10:41 PM

## 2024-11-02 NOTE — ED NOTES
Attempt to call over and check on pt CT scan. Spoke with tech who state she will give us a call back

## 2024-11-02 NOTE — PROGRESS NOTES
Pt admitted this am, seen and examined today  Awaiting MRI brain, pt is claustrophobic, ordered prn PO ativan prior to MRI  Neurology consulted  Recheck ionised ca in am

## 2024-11-02 NOTE — FLOWSHEET NOTE
4 Eyes Skin Assessment     NAME:  Eh Price  YOB: 1970  MEDICAL RECORD NUMBER:  [id number]    The patient is being assessed for  Admission    I agree that at least one RN has performed a thorough Head to Toe Skin Assessment on the patient. ALL assessment sites listed below have been assessed.      Areas assessed by both nurses:    Head, Face, Ears, Shoulders, Back, Chest, Arms, Elbows, Hands, Sacrum. Buttock, Coccyx, Ischium, Legs. Feet and Heels, and Under Medical Devices         Does the Patient have a Wound? No noted wound(s)       Miki Prevention initiated by RN: No  Wound Care Orders initiated by RN: No    Pressure Injury (Stage 3,4, Unstageable, DTI, NWPT, and Complex wounds) if present, place Wound referral order by RN under : No    New Ostomies, if present place, Ostomy referral order under : No     Nurse 1 eSignature: Electronically signed by Oscar Hutchinson RN on 11/2/24 at 5:50 AM EDT    **SHARE this note so that the co-signing nurse can place an eSignature**    Nurse 2 eSignature: Electronically signed by XOCHITL CASTELAN RN on 11/2/24 at 5:52 AM EDT

## 2024-11-02 NOTE — PLAN OF CARE
Problem: Discharge Planning  Goal: Discharge to home or other facility with appropriate resources  Outcome: Progressing  Flowsheets (Taken 11/2/2024 2932)  Discharge to home or other facility with appropriate resources:   Identify barriers to discharge with patient and caregiver   Arrange for interpreters to assist at discharge as needed   Arrange for needed discharge resources and transportation as appropriate     Problem: Pain  Goal: Verbalizes/displays adequate comfort level or baseline comfort level  Outcome: Progressing     Problem: Safety - Adult  Goal: Free from fall injury  Outcome: Progressing     Problem: ABCDS Injury Assessment  Goal: Absence of physical injury  Outcome: Progressing

## 2024-11-02 NOTE — CONSULTS
domenica  CN12: tongue midline with protrusion  Strength: good strength in all 4 extremities   Deep tendon reflexes: hyper reflexic in patellars BLE (?some exaggeration).  3+ LUE bicep/brachioradialis.  Normal in RUE.    Sensory: light touch intact in all 4 extremities.    Cerebellar/coordination: finger nose finger normal without ataxia  Tone: normal in all 4 extremities  Gait: deferred for safety.      Labs  Glucose 104  Na 142  K 3.9  Cl 106  BUN 18  Cr 1.0  Mg 2.01  Ca 10.4    Folate 15  B12 432  TSH 2.20  HIV negative  SCOTT negative  Syphilis IgG/IgM reactive    ALT 18  AST 29    WBC 9.0K  Hg 15.4  Platelets 303    ESR 32    UA negative    Studies  CT head w/o 11/1/24, independently reviewed  IMPRESSION:  No acute intracranial abnormality.    CTA head/neck 11/1/24, independently reviewed  IMPRESSION:  Unremarkable CTA of the head and neck.    Impression/Recommendations  Vision changes.   Muscle cramping.   BUE weakness ().   Fatigue.   Cognitive symptoms.    Hx syphilis.     The patient presents w/ multiple complaints.  He developed vision changes which have fluctuated over the past several months. Also has had intermittent muscle spasms for the past few months.  He suggests he is having some  strength weakness bilaterally.  More recently, he has been experiencing some cognitive symptoms.      At this point the etiology is unclear though warrants additional workup.      Check brain/orbits MRI w/wo.   Cervical spine MRI w/wo.     It may be worth getting ID input regarding PMH of syphilis.   Should be seen by Ophthalmology.      Perhaps we will consider spinal tap pending workup.      Ian Peters NP  [facility]    A copy of this note was provided for Dr Montague, Viktoria NELSON MD

## 2024-11-02 NOTE — PLAN OF CARE
Problem: Discharge Planning  Goal: Discharge to home or other facility with appropriate resources  11/2/2024 1431 by Terra Moreira RN  Outcome: Progressing  11/2/2024 0545 by Oscar Hutchinson RN  Outcome: Progressing     Problem: Pain  Goal: Verbalizes/displays adequate comfort level or baseline comfort level  11/2/2024 1431 by Terra Moreira RN  Outcome: Progressing  11/2/2024 0545 by Oscar Hutchinson RN  Outcome: Progressing     Problem: Safety - Adult  Goal: Free from fall injury  11/2/2024 1431 by Terra Moreira RN  Outcome: Progressing  11/2/2024 0545 by Oscar Hutchinson RN  Outcome: Progressing     Problem: ABCDS Injury Assessment  Goal: Absence of physical injury  11/2/2024 1431 by Terra Moreira RN  Outcome: Progressing  11/2/2024 0545 by Oscar Hutchinson, RN  Outcome: Progressing

## 2024-11-03 LAB
ANION GAP SERPL CALCULATED.3IONS-SCNC: 11 MMOL/L (ref 3–16)
BASOPHILS # BLD: 0.1 K/UL (ref 0–0.2)
BASOPHILS NFR BLD: 1 %
BUN SERPL-MCNC: 17 MG/DL (ref 7–20)
CA-I BLD-SCNC: 1.23 MMOL/L (ref 1.12–1.32)
CALCIUM SERPL-MCNC: 10 MG/DL (ref 8.3–10.6)
CHLORIDE SERPL-SCNC: 100 MMOL/L (ref 99–110)
CO2 SERPL-SCNC: 25 MMOL/L (ref 21–32)
CREAT SERPL-MCNC: 1 MG/DL (ref 0.9–1.3)
DEPRECATED RDW RBC AUTO: 15.6 % (ref 12.4–15.4)
EOSINOPHIL # BLD: 0.4 K/UL (ref 0–0.6)
EOSINOPHIL NFR BLD: 4.4 %
GFR SERPLBLD CREATININE-BSD FMLA CKD-EPI: 88 ML/MIN/{1.73_M2}
GLUCOSE SERPL-MCNC: 101 MG/DL (ref 70–99)
HCT VFR BLD AUTO: 42.8 % (ref 40.5–52.5)
HGB BLD-MCNC: 14.4 G/DL (ref 13.5–17.5)
LYMPHOCYTES # BLD: 3.2 K/UL (ref 1–5.1)
LYMPHOCYTES NFR BLD: 38 %
MCH RBC QN AUTO: 28.9 PG (ref 26–34)
MCHC RBC AUTO-ENTMCNC: 33.6 G/DL (ref 31–36)
MCV RBC AUTO: 86 FL (ref 80–100)
MONOCYTES # BLD: 0.9 K/UL (ref 0–1.3)
MONOCYTES NFR BLD: 10.4 %
NEUTROPHILS # BLD: 3.9 K/UL (ref 1.7–7.7)
NEUTROPHILS NFR BLD: 46.2 %
PH BLDV: 7.37 [PH] (ref 7.35–7.45)
PLATELET # BLD AUTO: 303 K/UL (ref 135–450)
PMV BLD AUTO: 8.4 FL (ref 5–10.5)
POTASSIUM SERPL-SCNC: 3.8 MMOL/L (ref 3.5–5.1)
RBC # BLD AUTO: 4.98 M/UL (ref 4.2–5.9)
SODIUM SERPL-SCNC: 136 MMOL/L (ref 136–145)
WBC # BLD AUTO: 8.4 K/UL (ref 4–11)

## 2024-11-03 PROCEDURE — 6370000000 HC RX 637 (ALT 250 FOR IP): Performed by: INTERNAL MEDICINE

## 2024-11-03 PROCEDURE — 94640 AIRWAY INHALATION TREATMENT: CPT

## 2024-11-03 PROCEDURE — 82330 ASSAY OF CALCIUM: CPT

## 2024-11-03 PROCEDURE — 85025 COMPLETE CBC W/AUTO DIFF WBC: CPT

## 2024-11-03 PROCEDURE — 94760 N-INVAS EAR/PLS OXIMETRY 1: CPT

## 2024-11-03 PROCEDURE — 36415 COLL VENOUS BLD VENIPUNCTURE: CPT

## 2024-11-03 PROCEDURE — 80048 BASIC METABOLIC PNL TOTAL CA: CPT

## 2024-11-03 PROCEDURE — 6360000002 HC RX W HCPCS: Performed by: INTERNAL MEDICINE

## 2024-11-03 PROCEDURE — 2580000003 HC RX 258: Performed by: INTERNAL MEDICINE

## 2024-11-03 PROCEDURE — 1200000000 HC SEMI PRIVATE

## 2024-11-03 RX ADMIN — TIOTROPIUM BROMIDE AND OLODATEROL 2 PUFF: 3.124; 2.736 SPRAY, METERED RESPIRATORY (INHALATION) at 09:13

## 2024-11-03 RX ADMIN — ENOXAPARIN SODIUM 40 MG: 100 INJECTION SUBCUTANEOUS at 09:28

## 2024-11-03 RX ADMIN — LISINOPRIL 10 MG: 10 TABLET ORAL at 09:28

## 2024-11-03 RX ADMIN — PANTOPRAZOLE SODIUM 40 MG: 40 TABLET, DELAYED RELEASE ORAL at 06:39

## 2024-11-03 RX ADMIN — ACETAMINOPHEN 325MG 650 MG: 325 TABLET ORAL at 04:47

## 2024-11-03 RX ADMIN — FLUTICASONE PROPIONATE 2 SPRAY: 50 SPRAY, METERED NASAL at 09:28

## 2024-11-03 RX ADMIN — CETIRIZINE HYDROCHLORIDE 10 MG: 10 TABLET, FILM COATED ORAL at 09:28

## 2024-11-03 RX ADMIN — SODIUM CHLORIDE, PRESERVATIVE FREE 10 ML: 5 INJECTION INTRAVENOUS at 09:28

## 2024-11-03 NOTE — PROGRESS NOTES
Pt resting in room throughout day, ambulating independently.  Call light within reach, bed rails up x2, pt demonstrates ability to call for assistance as needed.  Pt has MRI checklist at bedside and is working on completing it.

## 2024-11-03 NOTE — PLAN OF CARE
Problem: Discharge Planning  Goal: Discharge to home or other facility with appropriate resources  11/2/2024 2219 by Francisca Arzola RN  Outcome: Progressing  11/2/2024 1431 by Terra Moreira RN  Outcome: Progressing     Problem: Pain  Goal: Verbalizes/displays adequate comfort level or baseline comfort level  11/2/2024 2219 by Francisca Arzola RN  Outcome: Progressing  11/2/2024 1431 by Terra Moreira RN  Outcome: Progressing     Problem: Safety - Adult  Goal: Free from fall injury  11/2/2024 2219 by Francisca Arzola RN  Outcome: Progressing  11/2/2024 1431 by Terra Moreira RN  Outcome: Progressing     Problem: ABCDS Injury Assessment  Goal: Absence of physical injury  11/2/2024 2219 by Francisca Arzola RN  Outcome: Progressing  11/2/2024 1431 by Terra Moreira RN  Outcome: Progressing

## 2024-11-03 NOTE — PROGRESS NOTES
PORTABLE    Result Date: 11/1/2024  EXAMINATION: ONE XRAY VIEW OF THE CHEST 11/1/2024 5:57 pm COMPARISON: None. HISTORY: ORDERING SYSTEM PROVIDED HISTORY: Unknown neurologic disorder TECHNOLOGIST PROVIDED HISTORY: Reason for exam:->Unknown neurologic disorder Reason for Exam: ams FINDINGS: The heart, mediastinum and pulmonary vascularity are normal.  Lungs are well-expanded and clear. No skeletal abnormalities are present in the chest.     No significant findings in the chest.       CBC:   Recent Labs     11/01/24 1746 11/03/24  1126   WBC 9.0 8.4   HGB 15.4 14.4    303     BMP:    Recent Labs     11/01/24  1746 11/03/24  1126    136   K 3.9 3.8    100   CO2 23 25   BUN 18 17   CREATININE 1.0 1.0   GLUCOSE 104* 101*     Hepatic:   Recent Labs     11/01/24 1746   AST 29   ALT 18   BILITOT 0.5   ALKPHOS 79     Lipids:   Lab Results   Component Value Date/Time    CHOL 208 03/22/2024 05:16 AM    HDL 49 03/22/2024 05:16 AM    TRIG 129 03/22/2024 05:16 AM     Hemoglobin A1C:   Lab Results   Component Value Date/Time    LABA1C 5.6 03/22/2024 05:16 AM     TSH:   Lab Results   Component Value Date/Time    TSH 2.20 11/01/2024 05:46 PM     Troponin: No results found for: \"TROPONINT\"  Lactic Acid: No results for input(s): \"LACTA\" in the last 72 hours.  BNP: No results for input(s): \"PROBNP\" in the last 72 hours.  UA:  Lab Results   Component Value Date/Time    NITRU Negative 11/01/2024 08:15 PM    COLORU Yellow 11/01/2024 08:15 PM    PHUR 5.5 11/01/2024 08:15 PM    WBCUA 0 11/01/2024 08:15 PM    RBCUA 1 11/01/2024 08:15 PM    BACTERIA None Seen 11/01/2024 08:15 PM    CLARITYU Clear 11/01/2024 08:15 PM    LEUKOCYTESUR Negative 11/01/2024 08:15 PM    UROBILINOGEN 0.2 11/01/2024 08:15 PM    BILIRUBINUR Negative 11/01/2024 08:15 PM    BLOODU Negative 11/01/2024 08:15 PM    GLUCOSEU Negative 11/01/2024 08:15 PM    KETUA 15 11/01/2024 08:15 PM     Urine Cultures: No results found for: \"LABURIN\"  Blood Cultures:

## 2024-11-04 LAB
ALBUMIN SERPL ELPH-MCNC: 3.3 G/DL (ref 3.1–4.9)
ALPHA1 GLOB SERPL ELPH-MCNC: 0.3 G/DL (ref 0.2–0.4)
ALPHA2 GLOB SERPL ELPH-MCNC: 0.8 G/DL (ref 0.4–1.1)
ANION GAP SERPL CALCULATED.3IONS-SCNC: 9 MMOL/L (ref 3–16)
B BURGDOR IGG SER QL IB: NEGATIVE
B BURGDOR IGM SER QL IB: NEGATIVE
B-GLOBULIN SERPL ELPH-MCNC: 1.7 G/DL (ref 0.9–1.6)
BASOPHILS # BLD: 0.1 K/UL (ref 0–0.2)
BASOPHILS NFR BLD: 0.9 %
BUN SERPL-MCNC: 18 MG/DL (ref 7–20)
CALCIUM SERPL-MCNC: 9.4 MG/DL (ref 8.3–10.6)
CHLORIDE SERPL-SCNC: 104 MMOL/L (ref 99–110)
CO2 SERPL-SCNC: 26 MMOL/L (ref 21–32)
CREAT SERPL-MCNC: 1.1 MG/DL (ref 0.9–1.3)
DEPRECATED RDW RBC AUTO: 15 % (ref 12.4–15.4)
EOSINOPHIL # BLD: 0.4 K/UL (ref 0–0.6)
EOSINOPHIL NFR BLD: 6 %
GAMMA GLOB SERPL ELPH-MCNC: 1.7 G/DL (ref 0.6–1.8)
GFR SERPLBLD CREATININE-BSD FMLA CKD-EPI: 79 ML/MIN/{1.73_M2}
GLUCOSE SERPL-MCNC: 94 MG/DL (ref 70–99)
HCT VFR BLD AUTO: 39.4 % (ref 40.5–52.5)
HGB BLD-MCNC: 13.4 G/DL (ref 13.5–17.5)
LYMPHOCYTES # BLD: 2.8 K/UL (ref 1–5.1)
LYMPHOCYTES NFR BLD: 43.9 %
MCH RBC QN AUTO: 29.1 PG (ref 26–34)
MCHC RBC AUTO-ENTMCNC: 34 G/DL (ref 31–36)
MCV RBC AUTO: 85.4 FL (ref 80–100)
MONOCYTES # BLD: 0.7 K/UL (ref 0–1.3)
MONOCYTES NFR BLD: 10.3 %
NEUTROPHILS # BLD: 2.5 K/UL (ref 1.7–7.7)
NEUTROPHILS NFR BLD: 38.9 %
PLATELET # BLD AUTO: 241 K/UL (ref 135–450)
PMV BLD AUTO: 8.4 FL (ref 5–10.5)
POTASSIUM SERPL-SCNC: 4.1 MMOL/L (ref 3.5–5.1)
RBC # BLD AUTO: 4.61 M/UL (ref 4.2–5.9)
SODIUM SERPL-SCNC: 139 MMOL/L (ref 136–145)
SPE/IFE INTERPRETATION: NORMAL
WBC # BLD AUTO: 6.5 K/UL (ref 4–11)

## 2024-11-04 PROCEDURE — 6370000000 HC RX 637 (ALT 250 FOR IP): Performed by: INTERNAL MEDICINE

## 2024-11-04 PROCEDURE — 36415 COLL VENOUS BLD VENIPUNCTURE: CPT

## 2024-11-04 PROCEDURE — 94760 N-INVAS EAR/PLS OXIMETRY 1: CPT

## 2024-11-04 PROCEDURE — 2580000003 HC RX 258: Performed by: INTERNAL MEDICINE

## 2024-11-04 PROCEDURE — 80048 BASIC METABOLIC PNL TOTAL CA: CPT

## 2024-11-04 PROCEDURE — 6360000002 HC RX W HCPCS: Performed by: INTERNAL MEDICINE

## 2024-11-04 PROCEDURE — 94640 AIRWAY INHALATION TREATMENT: CPT

## 2024-11-04 PROCEDURE — 85025 COMPLETE CBC W/AUTO DIFF WBC: CPT

## 2024-11-04 PROCEDURE — 1200000000 HC SEMI PRIVATE

## 2024-11-04 PROCEDURE — 99222 1ST HOSP IP/OBS MODERATE 55: CPT | Performed by: INTERNAL MEDICINE

## 2024-11-04 RX ADMIN — TIOTROPIUM BROMIDE AND OLODATEROL 2 PUFF: 3.124; 2.736 SPRAY, METERED RESPIRATORY (INHALATION) at 11:37

## 2024-11-04 RX ADMIN — ENOXAPARIN SODIUM 40 MG: 100 INJECTION SUBCUTANEOUS at 09:02

## 2024-11-04 RX ADMIN — FLUTICASONE PROPIONATE 2 SPRAY: 50 SPRAY, METERED NASAL at 09:01

## 2024-11-04 RX ADMIN — PANTOPRAZOLE SODIUM 40 MG: 40 TABLET, DELAYED RELEASE ORAL at 05:44

## 2024-11-04 RX ADMIN — CETIRIZINE HYDROCHLORIDE 10 MG: 10 TABLET, FILM COATED ORAL at 09:02

## 2024-11-04 RX ADMIN — SODIUM CHLORIDE, PRESERVATIVE FREE 10 ML: 5 INJECTION INTRAVENOUS at 22:04

## 2024-11-04 RX ADMIN — LISINOPRIL 10 MG: 10 TABLET ORAL at 09:02

## 2024-11-04 RX ADMIN — METHOCARBAMOL 500 MG: 500 TABLET ORAL at 22:03

## 2024-11-04 ASSESSMENT — PAIN DESCRIPTION - DESCRIPTORS: DESCRIPTORS: SPASM

## 2024-11-04 ASSESSMENT — PAIN - FUNCTIONAL ASSESSMENT: PAIN_FUNCTIONAL_ASSESSMENT: ACTIVITIES ARE NOT PREVENTED

## 2024-11-04 ASSESSMENT — PAIN SCALES - GENERAL
PAINLEVEL_OUTOF10: 2
PAINLEVEL_OUTOF10: 0

## 2024-11-04 ASSESSMENT — PAIN DESCRIPTION - LOCATION: LOCATION: LEG

## 2024-11-04 ASSESSMENT — PAIN DESCRIPTION - ORIENTATION: ORIENTATION: RIGHT;LEFT

## 2024-11-04 NOTE — CONSULTS
Infectious Diseases Inpatient Consult Note      Reason for Consult:  Positive Syphilis testing muscle cramping multiple neurological symptoms    Requesting Physician:       Primary Care Physician:  Ernestina Anne PA-C    History Obtained From:  Epic and pt     CHIEF COMPLAINT:     Chief Complaint   Patient presents with    Neurologic Problem     Pt states his doctor sent him here because he has been having neurological problems lately- muscle spasms/clenching, R vision blurriness/changes, confusion, headaches. Pt states this all started in march when he was admitted for complex migraines, but in July it started to get worse. Pt states he gets a numbness/tingling sensation in back on his neck when he bends his head downward. Denies chest pain, n/v, SOB. Pt states he has pain in R hand and neck at the moment. Pt A&O x4 and gait steady in triage         HISTORY OF PRESENT ILLNESS:  53 y.o. male with a history of asymmetric sensory hearing loss, gastroesophageal reflux disease, hypertension, migraine, anxiety disorder admitted to hospital secondary to vision changes and left-sided numbness he now describes a right eye vision changes have all also complaining of muscle spasms he is easily fatigued and, no seizures labs indicate procalcitonin 0.03 LFT normal creatinine normal and B12 normal.  Folate normal CRP negative HIV screen negative hepatitis screen negative he has a history of syphilis in the past per Care Everywhere he tested positive in 2014, for routine STD screening patient has received 3 doses of long-acting penicillin injection per chart review.  On this admission for syphilis screening test is positive RPR is negative Treponema pallidum antibody in process we are consulted for recommendations, CTA negative, CT head negative, chest x-ray negative      Past Medical History:    Past Medical History:   Diagnosis Date    Asymmetrical sensorineural hearing loss     Environmental allergies     GERD    TREPONEMA ANTIBODY 10.40 INDEX     IMMUNOLOGY/VIROLOGY     Comment:  T. pallidum antibody detected.  RPR test is necessary for interpretation, is automatically performed, and is in progress.  REFERENCE RANGE:     Viral Culture:  janetone Encounter - Yo Horta - 04/08/2014 9:57 AM EDT  Formatting of this note might be different from the original.  Rpr/treponema testing positive  Patient informed  Will initiate treatment with 2.4 million untis im once a week for three weeks.  Electronically signed by Yo Horta at 04/08/2014 9:57 AM EDT   No results found for: \"COVID19\"  Urine Culture: No results for input(s): \"LABURIN\" in the last 72 hours.    Scheduled Meds:   sodium chloride flush  5-40 mL IntraVENous 2 times per day    enoxaparin  40 mg SubCUTAneous Daily    tiotropium-olodaterol  2 puff Inhalation Daily RT    fluticasone  2 spray Each Nostril Daily    lisinopril  10 mg Oral Daily    cetirizine  10 mg Oral Daily    pantoprazole  40 mg Oral QAM AC    LORazepam  1 mg Oral Once       Continuous Infusions:   sodium chloride         PRN Meds:  sodium chloride flush, sodium chloride, potassium chloride **OR** potassium alternative oral replacement **OR** potassium chloride, magnesium sulfate, LORazepam, ondansetron **OR** ondansetron, polyethylene glycol, acetaminophen **OR** acetaminophen, albuterol sulfate HFA, methocarbamol    Imaging:   CT Head W/O Contrast   Final Result   No acute intracranial abnormality.         CTA Head Neck W/Contrast   Final Result   Unremarkable CTA of the head and neck.         XR CHEST PORTABLE   Final Result   No significant findings in the chest.         MRI CERVICAL SPINE W WO CONTRAST    (Results Pending)   MRI ORBITS FACE NECK W WO CONTRAST    (Results Pending)       All pertinent images and reports for the current Hospitalization were reviewed by me.    IMPRESSION:    Patient Active Problem List   Diagnosis Code    Left sided numbness R20.0    Myoclonic jerking G25.3

## 2024-11-04 NOTE — PLAN OF CARE
Problem: Discharge Planning  Goal: Discharge to home or other facility with appropriate resources  11/4/2024 0906 by Jeniffer Dunn RN  Outcome: Progressing  11/3/2024 2321 by Francisca Arzola RN  Outcome: Progressing     Problem: Pain  Goal: Verbalizes/displays adequate comfort level or baseline comfort level  11/4/2024 0906 by Jeniffer Dunn RN  Outcome: Progressing  11/3/2024 2321 by Francisca Arzola RN  Outcome: Progressing     Problem: Safety - Adult  Goal: Free from fall injury  11/4/2024 0906 by Jeniffer Dunn RN  Outcome: Progressing  11/3/2024 2321 by Francisca Arzola, RN  Outcome: Progressing     Problem: ABCDS Injury Assessment  Goal: Absence of physical injury  11/4/2024 0906 by Jeniffer Dunn RN  Outcome: Progressing  11/3/2024 2321 by Francisca Arzola, RN  Outcome: Progressing

## 2024-11-04 NOTE — PROGRESS NOTES
V2.0    [facility] Progress Note      Name:  Eh Price /Age/Sex: 1970  (53 y.o. male)   MRN & CSN:  [id number] & [id number] Encounter Date/Time: 2024 3:24 PM EST   Location:  [address]- PCP: Ernestina Anne PA-C     Attending:Viktoria Montague MD       Hospital Day: 4    Assessment and Recommendations   Eh Price is a 53 y.o. malewho presents with Myoclonic jerking      Plan:     MRI brain ordered - but not yet done, possibly done Monday  Neurology consulted, appr recs  Repeat ionized ca level normal  Await MRI orbit and brain today  Given h/o syphillis and neurology concern will consult ID for any recs  Needs to see ophthal outpt         Diet ADULT DIET; Regular; Low Fat/Low Chol/High Fiber/JAIME   DVT Prophylaxis [x] Lovenox, []  Heparin, [] SCDs, [] Ambulation,  [] Eliquis, [] Xarelto  [] Coumadin   Code Status Full Code             Personally reviewed Lab Studies and Imaging        Subjective:     No acute events overnight, no further episodes overnight      Review of Systems:      Pertinent positives and negatives discussed in HPI    Objective:     Intake/Output Summary (Last 24 hours) at 2024 1524  Last data filed at 2024 0909  Gross per 24 hour   Intake 720 ml   Output --   Net 720 ml      Vitals:   Vitals:    24 0449 24 0745 24 1138 24 1215   BP:  120/80  124/85   Pulse:  64 95 70   Resp:  16 18 16   Temp:  97.5 °F (36.4 °C)  97.7 °F (36.5 °C)   TempSrc:  Oral  Oral   SpO2:  95% 95% 96%   Weight: 90.5 kg (199 lb 8.3 oz)      Height:             Physical Exam:      General: NAD  Eyes: EOMI  ENT: neck supple  Cardiovascular: Regular rate.  Respiratory: Clear to auscultation  Gastrointestinal: Soft, non tender  Musculoskeletal: No edema  Skin: warm, dry  Neuro: Alert.  Psych: Mood appropriate.         Medications:   Medications:    sodium chloride flush  5-40 mL IntraVENous 2 times per day    enoxaparin  40 mg SubCUTAneous Daily    tiotropium-olodaterol   VERTEBRAL ARTERIES: No dissection, arterial injury, or significant stenosis. SOFT TISSUES: The lung apices are clear.  No cervical or superior mediastinal lymphadenopathy.  A calcified mediastinal lymph node is incidentally noted. The larynx and pharynx are unremarkable.  No acute abnormality of the salivary and thyroid glands. BONES: No acute osseous abnormality. CTA HEAD: ANTERIOR CIRCULATION: No significant stenosis of the intracranial internal carotid, anterior cerebral, or middle cerebral arteries. No aneurysm. POSTERIOR CIRCULATION: No significant stenosis of the vertebral, basilar, or posterior cerebral arteries. No aneurysm. OTHER: No dural venous sinus thrombosis on this non-dedicated study.     Unremarkable CTA of the head and neck.     CT Head W/O Contrast    Result Date: 11/1/2024  EXAMINATION: CT OF THE HEAD WITHOUT CONTRAST,  11/1/2024 5:20 pm TECHNIQUE: CT of the head was performed without the administration of intravenous contrast. Automated exposure control, iterative reconstruction, and/or weight based adjustment of the mA/kV was utilized to reduce the radiation dose to as low as reasonably achievable. COMPARISON: None HISTORY: ORDERING SYSTEM PROVIDED HISTORY:  Unknown neurologic disorder TECHNOLOGIST PROVIDED HISTORY: Reason for Exam:  Unknown neurologic disorder Has a \"code stroke\" or \"stroke alert\" been called?   No Decision Support Exception - unselect if not a suspected or confirmed emergency medical condition->Emergency Medical Condition (MA) FINDINGS: BRAIN/VENTRICLES: There is no acute intracranial hemorrhage, mass effect or midline shift.  No abnormal extra-axial fluid collection.  The gray-white differentiation is maintained without evidence of an acute infarct.  There is no evidence of hydrocephalus. ORBITS: The visualized portion of the orbits demonstrate no acute abnormality. SINUSES: The visualized paranasal sinuses and mastoid air cells demonstrate no acute abnormality. SOFT

## 2024-11-04 NOTE — PLAN OF CARE
Problem: Discharge Planning  Goal: Discharge to home or other facility with appropriate resources  11/3/2024 2321 by Francisca Arzola RN  Outcome: Progressing  11/3/2024 1827 by Analia Butler RN  Outcome: Progressing     Problem: Pain  Goal: Verbalizes/displays adequate comfort level or baseline comfort level  11/3/2024 2321 by Francisca Arzola RN  Outcome: Progressing  11/3/2024 1827 by Analia Butler RN  Outcome: Progressing     Problem: Safety - Adult  Goal: Free from fall injury  11/3/2024 2321 by Francisca Arzola RN  Outcome: Progressing  11/3/2024 1827 by Analia Butler RN  Outcome: Progressing     Problem: ABCDS Injury Assessment  Goal: Absence of physical injury  Outcome: Progressing

## 2024-11-04 NOTE — CARE COORDINATION
Discharge Planning:      (CM) reviewed the patient's chart to assess needs. Patient's Readmission Risk Score is 7%  . Patient's medical insurance is  Payor: BCBS / Plan: BCBS OUT OF STATE / Product Type: *No Product type* / .  Patient's PCP is Ernestina Anne PA-C .  No needs anticipated, at this time. CM team to follow. Staff to inform CM if additional discharge needs arise.    Pts preferred pharmacy is   [facility] [id number] - [place], [place] - [address] -  [phone] - F [phone]  [address] [facility] [address]  Phone: [phone] Fax: [phone]    [id number] - [place], [place] - [address] - P [phone] - F [phone]  [address] [address]  Phone: [phone] Fax: [phone]    Please consult SW/CM if a d/c need should arise.  EDWIN Cavazos  [phone]  Electronically signed by EDWIN Sosa on 11/4/2024 at 10:29 AM

## 2024-11-05 ENCOUNTER — APPOINTMENT (OUTPATIENT)
Dept: MRI IMAGING | Age: 54
End: 2024-11-05
Payer: COMMERCIAL

## 2024-11-05 PROBLEM — G98.8 NEUROLOGIC DISORDER: Status: ACTIVE | Noted: 2024-11-05

## 2024-11-05 PROBLEM — Z86.19: Status: ACTIVE | Noted: 2024-11-05

## 2024-11-05 PROBLEM — Z86.19 H/O SYPHILIS: Status: ACTIVE | Noted: 2024-11-05

## 2024-11-05 LAB
ANION GAP SERPL CALCULATED.3IONS-SCNC: 7 MMOL/L (ref 3–16)
ARSENIC BLD-MCNC: <10 UG/L
BASOPHILS # BLD: 0.1 K/UL (ref 0–0.2)
BASOPHILS NFR BLD: 1.3 %
BUN SERPL-MCNC: 18 MG/DL (ref 7–20)
CALCIUM SERPL-MCNC: 9.2 MG/DL (ref 8.3–10.6)
CHLORIDE SERPL-SCNC: 106 MMOL/L (ref 99–110)
CO2 SERPL-SCNC: 26 MMOL/L (ref 21–32)
CREAT SERPL-MCNC: 1.1 MG/DL (ref 0.9–1.3)
DEPRECATED RDW RBC AUTO: 15.1 % (ref 12.4–15.4)
EOSINOPHIL # BLD: 0.4 K/UL (ref 0–0.6)
EOSINOPHIL NFR BLD: 7.1 %
GFR SERPLBLD CREATININE-BSD FMLA CKD-EPI: 79 ML/MIN/{1.73_M2}
GLUCOSE SERPL-MCNC: 96 MG/DL (ref 70–99)
HCT VFR BLD AUTO: 38.5 % (ref 40.5–52.5)
HGB BLD-MCNC: 13.2 G/DL (ref 13.5–17.5)
LEAD BLD-MCNC: <2 UG/DL
LYMPHOCYTES # BLD: 2.4 K/UL (ref 1–5.1)
LYMPHOCYTES NFR BLD: 40.1 %
MCH RBC QN AUTO: 29.1 PG (ref 26–34)
MCHC RBC AUTO-ENTMCNC: 34.2 G/DL (ref 31–36)
MCV RBC AUTO: 85.2 FL (ref 80–100)
MERCURY BLD-MCNC: <2.5 UG/L
MONOCYTES # BLD: 0.7 K/UL (ref 0–1.3)
MONOCYTES NFR BLD: 11.1 %
NEUTROPHILS # BLD: 2.4 K/UL (ref 1.7–7.7)
NEUTROPHILS NFR BLD: 40.4 %
PLATELET # BLD AUTO: 226 K/UL (ref 135–450)
PMV BLD AUTO: 8.7 FL (ref 5–10.5)
POTASSIUM SERPL-SCNC: 4.2 MMOL/L (ref 3.5–5.1)
RBC # BLD AUTO: 4.52 M/UL (ref 4.2–5.9)
SODIUM SERPL-SCNC: 139 MMOL/L (ref 136–145)
WBC # BLD AUTO: 6 K/UL (ref 4–11)

## 2024-11-05 PROCEDURE — 94640 AIRWAY INHALATION TREATMENT: CPT

## 2024-11-05 PROCEDURE — 80048 BASIC METABOLIC PNL TOTAL CA: CPT

## 2024-11-05 PROCEDURE — 2580000003 HC RX 258: Performed by: INTERNAL MEDICINE

## 2024-11-05 PROCEDURE — 85025 COMPLETE CBC W/AUTO DIFF WBC: CPT

## 2024-11-05 PROCEDURE — 36415 COLL VENOUS BLD VENIPUNCTURE: CPT

## 2024-11-05 PROCEDURE — 6370000000 HC RX 637 (ALT 250 FOR IP): Performed by: INTERNAL MEDICINE

## 2024-11-05 PROCEDURE — 1200000000 HC SEMI PRIVATE

## 2024-11-05 PROCEDURE — 94760 N-INVAS EAR/PLS OXIMETRY 1: CPT

## 2024-11-05 RX ORDER — LORAZEPAM 2 MG/ML
0.5 INJECTION INTRAMUSCULAR ONCE
Status: DISCONTINUED | OUTPATIENT
Start: 2024-11-06 | End: 2024-11-08 | Stop reason: HOSPADM

## 2024-11-05 RX ADMIN — PANTOPRAZOLE SODIUM 40 MG: 40 TABLET, DELAYED RELEASE ORAL at 06:08

## 2024-11-05 RX ADMIN — LORAZEPAM 1 MG: 1 TABLET ORAL at 12:18

## 2024-11-05 RX ADMIN — CETIRIZINE HYDROCHLORIDE 10 MG: 10 TABLET, FILM COATED ORAL at 08:33

## 2024-11-05 RX ADMIN — LISINOPRIL 10 MG: 10 TABLET ORAL at 08:33

## 2024-11-05 RX ADMIN — TIOTROPIUM BROMIDE AND OLODATEROL 2 PUFF: 3.124; 2.736 SPRAY, METERED RESPIRATORY (INHALATION) at 09:14

## 2024-11-05 RX ADMIN — FLUTICASONE PROPIONATE 2 SPRAY: 50 SPRAY, METERED NASAL at 08:33

## 2024-11-05 RX ADMIN — SODIUM CHLORIDE, PRESERVATIVE FREE 10 ML: 5 INJECTION INTRAVENOUS at 20:32

## 2024-11-05 NOTE — PROGRESS NOTES
EMERGENCY DEPARTMENT ENCOUNTER        Pt Name: Kimberley Bedolla  MRN: [id number]  [place] 1984  Date of evaluation: 8/7/2020  Provider: Genia Grimaldo MD  PCP: Jerry Lemons MD      [address] [place] [address]       Chief Complaint   Patient presents with    Abdominal Pain     started at 0400 this morning, sharp pain, hard to pin point, no gallbladder, 7/10       HISTORY OFPRESENT ILLNESS   (Location/Symptom, Timing/Onset, Context/Setting, Quality, Duration, Modifying Factors,Severity)  Note limiting factors. Kimberley Bedolla is a 38 y.o. female presenting today due to concern for waking up around 4 AM and using the restroom and shortly after this becoming very dizzy like the room was spinning but also complaining of severe diffuse abdominal pain. She denies any specific spot in her abdomen. No nausea or vomiting or diarrhea. She did complain of generalized weakness. She did initially break out into a cold sweat when the pain came on. No actual fever. She denied having any abnormal symptoms yesterday. She is not currently menstruating. She does state that she is on estrogen. No reported history of blood clots. No headache or syncope. No chest pain or shortness of breath. Since nothing was helping with the pain, along with feeling severely weak, she came to the emergency department for further evaluation. REVIEW OF SYSTEMS    (2-9 systems for level 4, 10 or more for level 5)     Review of Systems   Constitutional: Positive for chills, diaphoresis and fatigue. Negative for fever. HENT: Negative for congestion. Eyes: Negative for photophobia, pain, redness and visual disturbance. Respiratory: Negative for shortness of breath. Cardiovascular: Negative for chest pain. Gastrointestinal: Positive for abdominal pain. Negative for diarrhea, nausea and vomiting. Genitourinary: Negative for flank pain and vaginal bleeding. Musculoskeletal: Negative for back pain and neck pain. Pt off floor for MRI   Skin: Positive for pallor. Neurological: Positive for dizziness (room-spinning with sitting up, currently gone), weakness (generalized) and light-headedness. Negative for syncope and headaches. Psychiatric/Behavioral: Negative for confusion. Positives and Pertinent negatives as per HPI.       PASTMEDICAL HISTORY     Past Medical History:   Diagnosis Date    Arthritis     BMI 45.0-49.9, adult ([facility]     Endometriosis     Maternal blood transfusion     PP hemmorhage     Morbidly obese ([facility]     Pregnancy related carpal tunnel syndrome, antepartum          SURGICAL HISTORY       Past Surgical History:   Procedure Laterality Date     SECTION      CHOLECYSTECTOMY      PELVIC LAPAROSCOPY  age 13    Endometriosis         CURRENT MEDICATIONS       Current Discharge Medication List      CONTINUE these medications which have NOT CHANGED    Details   DICLOFENAC PO Take 75 mg by mouth 2 times daily      acetaminophen (TYLENOL) 500 MG tablet Take 500 mg by mouth every 6 hours as needed for Pain             ALLERGIES     Codeine and Dilaudid [hydromorphone]    FAMILY HISTORY       Family History   Problem Relation Age of Onset    Hypertension Mother    Jossy Bowman Arthritis Mother     High Blood Pressure Mother     Early Death Maternal Aunt     Cancer Maternal Grandmother         Cervical cancer    Early Death Maternal Grandfather           SOCIAL HISTORY       Social History     Socioeconomic History    Marital status:      Spouse name: None    Number of children: 1    Years of education: None    Highest education level: None   Occupational History    None   Social Needs    Financial resource strain: None    Food insecurity     Worry: None     Inability: None    Transportation needs     Medical: None     Non-medical: None   Tobacco Use    Smoking status: Former Smoker     Types: Cigarettes     Last attempt to quit: 2013     Years since quittin.1    Smokeless tobacco: Never Used   Substance and Sexual Activity    Alcohol use: No    Drug use: No    Sexual activity: Yes     Partners: Male   Lifestyle    Physical activity     Days per week: None     Minutes per session: None    Stress: None   Relationships    Social connections     Talks on phone: None     Gets together: None     Attends Confucianist service: None     Active member of club or organization: None     Attends meetings of clubs or organizations: None     Relationship status: None    Intimate partner violence     Fear of current or ex partner: None     Emotionally abused: None     Physically abused: None     Forced sexual activity: None   Other Topics Concern    None   Social History Narrative    None       SCREENINGS                PHYSICAL EXAM    (up to 7 for level 4, 8 or more for level 5)     ED Triage Vitals [08/07/20 0614]   BP Temp Temp Source Pulse Resp SpO2 Height Weight   (!) 66/43 97.5 °F (36.4 °C) Axillary 96 18 100 % 5' 7\" (1.702 m) 230 lb (104.3 kg)       Physical Exam  Vitals signs and nursing note reviewed. Constitutional:       General: She is in acute distress (mild). Appearance: She is obese. She is ill-appearing. Interventions: She is not intubated. HENT:      Head: Normocephalic and atraumatic. Right Ear: External ear normal.      Left Ear: External ear normal.      Nose: Nose normal.   Eyes:      General:         Right eye: No discharge. Left eye: No discharge. Extraocular Movements: Extraocular movements intact. Conjunctiva/sclera: Conjunctivae normal.      Pupils: Pupils are equal, round, and reactive to light. Neck:      Musculoskeletal: Neck supple. No neck rigidity or muscular tenderness. Cardiovascular:      Rate and Rhythm: Normal rate and regular rhythm. Pulses:           Radial pulses are 1+ on the right side and 1+ on the left side.    Pulmonary:      Effort: Pulmonary effort is normal. No tachypnea, bradypnea, accessory muscle usage, prolonged expiration, respiratory distress or retractions. She is not intubated. Breath sounds: Normal breath sounds and air entry. No stridor, decreased air movement or transmitted upper airway sounds. No decreased breath sounds, wheezing, rhonchi or rales. Abdominal:      General: Abdomen is flat. There is no distension. Palpations: Abdomen is soft. Tenderness: There is generalized abdominal tenderness (mild). There is no right CVA tenderness, left CVA tenderness, guarding or rebound. Negative signs include Pierce's sign and McBurney's sign. Musculoskeletal: Normal range of motion. General: No swelling, tenderness, deformity or signs of injury. Right lower leg: No edema. Left lower leg: No edema. Skin:     General: Skin is cool and moist.      Coloration: Skin is pale. Skin is not ashen, cyanotic or jaundiced. Findings: No abrasion, ecchymosis or laceration. Neurological:      General: No focal deficit present. Mental Status: She is alert and oriented to person, place, and time. Mental status is at baseline. GCS: GCS eye subscore is 4. GCS verbal subscore is 5. GCS motor subscore is 6. Cranial Nerves: Cranial nerves are intact. No cranial nerve deficit, dysarthria or facial asymmetry. Sensory: Sensation is intact. No sensory deficit. Motor: Motor function is intact. No weakness, tremor, atrophy, abnormal muscle tone or seizure activity. Psychiatric:         Attention and Perception: Attention normal.         Mood and Affect: Affect normal. Mood is depressed. Speech: Speech normal.         Behavior: Behavior normal. Behavior is cooperative.              DIAGNOSTIC RESULTS   :    Labs Reviewed   CBC WITH AUTO DIFFERENTIAL - Abnormal; Notable for the following components:       Result Value    WBC 15.4 (*)     Neutrophils Absolute 11.0 (*)     All other components within normal limits    Narrative:     Performed at:  [facility]) - [address], [facility] [employer]   Phone [phone]   COMPREHENSIVE METABOLIC PANEL - Abnormal; Notable for the following components:    Glucose 226 (*)     GFR Non- 56 (*)     AST 11 (*)     All other components within normal limits    Narrative:     Performed at:  [address], [facility] [employer]   Phone [phone]   LIPASE - Abnormal; Notable for the following components:    Lipase 78.0 (*)     All other components within normal limits    Narrative:     Performed at:  [address], [facility] [employer]   Phone [phone]   CALCIUM, IONIZED - Abnormal; Notable for the following components:    Calcium, Ion 1.01 (*)     pH, Chad 7.279 (*)     All other components within normal limits    Narrative:     Performed at:  [place] [address] [employer]   Phone [phone]   BLOOD GAS, VENOUS - Abnormal; Notable for the following components:    pH, Chad 7.251 (*)     HCO3, Venous 21.4 (*)     Base Excess, Chad -6.0 (*)     Carboxyhemoglobin 3.8 (*)     All other components within normal limits    Narrative:     Performed at:  [place] [address] [employer]   Phone [phone]   LACTIC ACID, PLASMA - Abnormal; Notable for the following components:    Lactic Acid 2.4 (*)     All other components within normal limits    Narrative:     Performed at:  [address], [facility] [employer]   Phone [phone]   LACTIC ACID, PLASMA - Abnormal; Notable for the following components:    Lactic Acid 3.0 (*)     All other components within normal limits    Narrative:     Performed at:  [address], [facility] [employer]   Phone [phone] - Abnormal; Notable for the following components:    Protime 15.1 (*)     INR 1.30 (*)     All other components within normal limits    Narrative:     Performed at:  [address],  [place], [address] [place]   Phone [phone]   BLOOD GAS, VENOUS - Abnormal; Notable for the following components:    pH, Chad 7.259 (*)     pO2, Chad 47.3 (*)     HCO3, Venous 20.4 (*)     Base Excess, Chad -6.6 (*)     Carboxyhemoglobin 3.3 (*)     All other components within normal limits    Narrative:     Performed at:  [address],  [place], [address] [place]   Phone [phone]   CULTURE, BLOOD 1   CULTURE, BLOOD 2   TROPONIN    Narrative:     Performed at:  [address],  [place], [address]   Phone [phone]   HCG, SERUM, QUALITATIVE    Narrative:     Performed at:  [address],  [place], [address] [place]   Phone [phone]   ETHANOL    Narrative:     Performed at:  [address],  [place], [address] [place]   Phone [phone]   MAGNESIUM    Narrative:     Performed at:  [address],  [place], [address]   Phone [phone]   D-DIMER, QUANTITATIVE    Narrative:     Performed at:  [address],  [place], [address]   Phone [phone]   HCG, QUANTITATIVE, PREGNANCY    Narrative:     Performed at:  [address],  [place], [address]   Phone [phone]    Narrative:     Performed at:  [address],  [place], [address] [place]   Phone [phone] [address], BLOOD   SURGICAL PATHOLOGY   TYPE AND SCREEN    Narrative:     Performed at:  [facility]) [facility]  [address] [place] [address],  [place], [address] [place] [address] [place]   Phone [phone]   PREPARE RBC (CROSSMATCH)       All other labs were within normal range or not returned asof this dictation. EKG: All EKG's are interpreted by the Emergency Department Physician who either signs or Co-signs this chart in the absence of a cardiologist.    The Ekg interpreted by me shows  normal sinus rhythm with a rate of 95  Axis is   Normal  QTc is  borderline prolonged QT  Intervals and Durations are unremarkable. ST Segments: no acute change and nonspecific changes  No significant change from prior EKG dated - 11/9/16  No STEMI           RADIOLOGY:   Non-plain film images such as CT, Ultrasound and MRI are read by the radiologist. Redgie Guard images are visualized and preliminarily interpreted by the  ED Provider with the belowfindings:        Interpretation per the Radiologist below, if available at the time of this note:    [address] 14 WEEKS   Final Result   Complex heterogeneous mass in the right adnexa with complex free fluid in the   pelvis suspicious for ruptured ectopic and till proven underlies. A normal   right ovary is not seen. Results discussed with Pearlean Merlin by Rosa Cantu. Kayla Bueno MD at 7:58 am on   8/7/2020         XR CHEST PORTABLE   Final Result   1. No acute abnormality. PROCEDURES   Unless otherwise noted below, none     Procedures    CRITICAL CARE TIME   Time: at least 60 minutes   Includes repeat examinations, speaking with consultants, lab interpretation, charting, treating for hypotension with IV fluids   Excludes separate billable procedures. Patient at risk for serious decompensation if not treated for this life-threatening condition. CONSULTS: Spoke with Dr. Daylin Henson at 0482 due to concern for ectopic pregnancy.   She stated at this point she would rather get the ultrasound but then otherwise will have 1 of her colleagues here. Spoke again with Dr. Reinaldo Pitt and she stated Dr. Cheryl Bey would see the patient at bedside due to concern for ectopic pregnancy.     IP CONSULT TO OB GYN    EMERGENCY DEPARTMENT COURSE and DIFFERENTIAL DIAGNOSIS/MDM:   Vitals:    Vitals:    08/07/20 1330 08/07/20 1335 08/07/20 1345 08/07/20 1400   BP: 107/63  110/67 122/65   Pulse: 90  91 105   Resp: (!) 32      Temp:  97 °F (36.1 °C)     TempSrc:  Temporal     SpO2: 99%  96% 94%   Weight:       Height:           Patient was given the following medications:  Medications   0.9 % sodium chloride bolus (has no administration in time range)   0.9 % sodium chloride bolus (has no administration in time range)   calcium gluconate 1 g in sodium chloride 50 mL (has no administration in time range)   fentaNYL (SUBLIMAZE) injection 25 mcg ( Intravenous Given 8/7/20 1216)   oxyCODONE-acetaminophen (PERCOCET) 5-325 MG per tablet 1 tablet (has no administration in time range)     Or   oxyCODONE-acetaminophen (PERCOCET) 5-325 MG per tablet 2 tablet (has no administration in time range)   ondansetron (ZOFRAN) injection 4 mg (has no administration in time range)   promethazine (PHENERGAN) injection 6.25 mg (has no administration in time range)   hydrALAZINE (APRESOLINE) injection 5 mg (has no administration in time range)   meperidine (DEMEROL) injection 12.5 mg (has no administration in time range)   lactated ringers irrigation solution (1,600 mLs Irrigation Given 8/7/20 1101)   lactated ringers infusion (has no administration in time range)   0.9 % sodium chloride bolus (0 mLs Intravenous Stopped 8/7/20 0712)   ondansetron (ZOFRAN) injection 4 mg (4 mg Intravenous Given 8/7/20 0654)   lactated ringers bolus (0 mLs Intravenous Stopped 8/7/20 0905)   ceFAZolin (ANCEF) 3 g in dextrose 5 % 100 mL IVPB ( Intravenous Stopped 8/7/20 0930)     Patient was evaluated due to concern for generalized abdominal pain starting this morning along with dizziness with sitting up and feeling very weak all over. At this time, I am considering sepsis, vertigo, pulmonary embolism, acute coronary syndrome, dehydration, appendicitis, amongst other pathology. She will need to get a CT of the abdomen and pelvis based on her pain and depending on her d-dimer, may need a CT of the chest as well. No reported falls or trauma and she denies any headache. Story not suggestive of TIA or stroke, especially with the dizziness being positional and with her known hypotension at this time. CT was ultimately never gotten because her pregnancy test was positive and therefore I had ultrasound come to the bedside. This did show a large amount of free fluid in the abdomen without a intrauterine pregnancy. Radiologist did call me to inform me of concern for right ectopic pregnancy. I spoke to the OB/GYN who plan to take her emergently to the operating room. Her blood pressure did stabilize in the emergency department. Due to concern for active hemorrhage, I did order 1 unit of type and cross blood. She went to the operating room in critical and guarded condition due to concern for active hemorrhage. Otherwise, please see gynecology's note for further care of the patient. Ultrasound technician stated there was no blood noted on the transvaginal probe once the exam was completed. The patient tolerated their visit well. The patient and / or the family were informed of the results of any tests, a time was given to answer questions. FINAL IMPRESSION      1. Ruptured right tubal ectopic pregnancy causing hemoperitoneum    2.  Hypotension, unspecified hypotension type          DISPOSITION/PLAN   DISPOSITION  -decision to admit      PATIENT REFERRED TO:  Munira [address] Shabana [place] [address] [facility]  [phone]            DISCHARGEMEDICATIONS:  Current Discharge Medication List          DISCONTINUED MEDICATIONS:  Current Discharge Medication List (Please note that portions of this note were completed with a voicerecognition program.  Efforts were made to edit the dictations but occasionally words are mis-transcribed.)    Jose Hurt MD (electronically signed)            Jose Hurt MD  08/07/20 5721

## 2024-11-05 NOTE — PROGRESS NOTES
significant stenosis of the brachiocephalic or subclavian arteries. CAROTID ARTERIES: No dissection, arterial injury, or hemodynamically significant stenosis by NASCET criteria. VERTEBRAL ARTERIES: No dissection, arterial injury, or significant stenosis. SOFT TISSUES: The lung apices are clear.  No cervical or superior mediastinal lymphadenopathy.  A calcified mediastinal lymph node is incidentally noted. The larynx and pharynx are unremarkable.  No acute abnormality of the salivary and thyroid glands. BONES: No acute osseous abnormality. CTA HEAD: ANTERIOR CIRCULATION: No significant stenosis of the intracranial internal carotid, anterior cerebral, or middle cerebral arteries. No aneurysm. POSTERIOR CIRCULATION: No significant stenosis of the vertebral, basilar, or posterior cerebral arteries. No aneurysm. OTHER: No dural venous sinus thrombosis on this non-dedicated study.     Unremarkable CTA of the head and neck.     CT Head W/O Contrast    Result Date: 11/1/2024  EXAMINATION: CT OF THE HEAD WITHOUT CONTRAST,  11/1/2024 5:20 pm TECHNIQUE: CT of the head was performed without the administration of intravenous contrast. Automated exposure control, iterative reconstruction, and/or weight based adjustment of the mA/kV was utilized to reduce the radiation dose to as low as reasonably achievable. COMPARISON: None HISTORY: ORDERING SYSTEM PROVIDED HISTORY:  Unknown neurologic disorder TECHNOLOGIST PROVIDED HISTORY: Reason for Exam:  Unknown neurologic disorder Has a \"code stroke\" or \"stroke alert\" been called?   No Decision Support Exception - unselect if not a suspected or confirmed emergency medical condition->Emergency Medical Condition (MA) FINDINGS: BRAIN/VENTRICLES: There is no acute intracranial hemorrhage, mass effect or midline shift.  No abnormal extra-axial fluid collection.  The gray-white differentiation is maintained without evidence of an acute infarct.  There is no evidence of hydrocephalus. ORBITS: The  visualized portion of the orbits demonstrate no acute abnormality. SINUSES: The visualized paranasal sinuses and mastoid air cells demonstrate no acute abnormality. SOFT TISSUES/SKULL:  No acute abnormality of the visualized skull or soft tissues.     No acute intracranial abnormality.     XR CHEST PORTABLE    Result Date: 11/1/2024  EXAMINATION: ONE XRAY VIEW OF THE CHEST 11/1/2024 5:57 pm COMPARISON: None. HISTORY: ORDERING SYSTEM PROVIDED HISTORY: Unknown neurologic disorder TECHNOLOGIST PROVIDED HISTORY: Reason for exam:->Unknown neurologic disorder Reason for Exam: ams FINDINGS: The heart, mediastinum and pulmonary vascularity are normal.  Lungs are well-expanded and clear. No skeletal abnormalities are present in the chest.     No significant findings in the chest.       CBC:   Recent Labs     11/03/24  1126 11/04/24 0520 11/05/24 0533   WBC 8.4 6.5 6.0   HGB 14.4 13.4* 13.2*    241 226     BMP:    Recent Labs     11/03/24  1126 11/04/24 0520 11/05/24 0533    139 139   K 3.8 4.1 4.2    104 106   CO2 25 26 26   BUN 17 18 18   CREATININE 1.0 1.1 1.1   GLUCOSE 101* 94 96     Hepatic: No results for input(s): \"AST\", \"ALT\", \"BILITOT\", \"ALKPHOS\" in the last 72 hours.    Invalid input(s): \"ALB\"  Lipids:   Lab Results   Component Value Date/Time    CHOL 208 03/22/2024 05:16 AM    HDL 49 03/22/2024 05:16 AM    TRIG 129 03/22/2024 05:16 AM     Hemoglobin A1C:   Lab Results   Component Value Date/Time    LABA1C 5.6 03/22/2024 05:16 AM     TSH:   Lab Results   Component Value Date/Time    TSH 2.20 11/01/2024 05:46 PM     Troponin: No results found for: \"TROPONINT\"  Lactic Acid: No results for input(s): \"LACTA\" in the last 72 hours.  BNP: No results for input(s): \"PROBNP\" in the last 72 hours.  UA:  Lab Results   Component Value Date/Time    NITRU Negative 11/01/2024 08:15 PM    COLORU Yellow 11/01/2024 08:15 PM    PHUR 5.5 11/01/2024 08:15 PM    WBCUA 0 11/01/2024 08:15 PM    RBCUA 1 11/01/2024

## 2024-11-05 NOTE — PLAN OF CARE
Problem: Discharge Planning  Goal: Discharge to home or other facility with appropriate resources  11/5/2024 0803 by Jeniffer Dunn RN  Outcome: Progressing  11/4/2024 2233 by Caron Tao RN  Outcome: Progressing     Problem: Pain  Goal: Verbalizes/displays adequate comfort level or baseline comfort level  11/5/2024 0803 by Jeniffer Dunn RN  Outcome: Progressing  11/4/2024 2233 by Caron Tao RN  Outcome: Progressing     Problem: Safety - Adult  Goal: Free from fall injury  11/5/2024 0803 by Jeniffer Dunn RN  Outcome: Progressing  11/4/2024 2233 by Caron Tao RN  Outcome: Progressing     Problem: ABCDS Injury Assessment  Goal: Absence of physical injury  11/5/2024 0803 by Jeniffer Dunn RN  Outcome: Progressing  11/4/2024 2233 by Caron Tao RN  Outcome: Progressing     Problem: Neurosensory - Adult  Goal: Achieves stable or improved neurological status  11/5/2024 0803 by Jeniffer Dunn RN  Outcome: Progressing  11/4/2024 2233 by Caron Tao RN  Outcome: Progressing     Problem: Respiratory - Adult  Goal: Achieves optimal ventilation and oxygenation  11/5/2024 0803 by Jeniffer Dunn RN  Outcome: Progressing  11/4/2024 2233 by Caron Tao RN  Outcome: Progressing     Problem: Cardiovascular - Adult  Goal: Maintains optimal cardiac output and hemodynamic stability  11/5/2024 0803 by Jeniffer Dunn RN  Outcome: Progressing  11/4/2024 2233 by Caron Tao RN  Outcome: Progressing     Problem: Gastrointestinal - Adult  Goal: Maintains adequate nutritional intake  11/5/2024 0803 by Jeniffer Dunn RN  Outcome: Progressing  11/4/2024 2233 by Caron Tao RN  Outcome: Progressing     Problem: Infection - Adult  Goal: Absence of infection at discharge  11/5/2024 0803 by Jeniffer Dunn RN  Outcome: Progressing  11/4/2024 2233 by Caron Tao RN  Outcome: Progressing     Problem: Metabolic/Fluid and Electrolytes - Adult  Goal: Electrolytes maintained

## 2024-11-05 NOTE — PROGRESS NOTES
potassium alternative oral replacement **OR** potassium chloride, magnesium sulfate, LORazepam, ondansetron **OR** ondansetron, polyethylene glycol, acetaminophen **OR** acetaminophen, albuterol sulfate HFA, methocarbamol     Exam:  Blood pressure 121/78, pulse 61, temperature 97.6 °F (36.4 °C), temperature source Oral, resp. rate 16, height 1.753 m (5' 9\"), weight 91.3 kg (201 lb 4.5 oz), SpO2 99%.  Constitutional    Vital signs: BP, HR, and RR reviewed   General Alert, no distress, well-nourished  Eyes: unable to visualize the fundi  Cardiovascular: pulses symmetric in all 4 extremities.  No peripheral edema.  Psychiatric: cooperative with examination, no  psychotic behavior noted.  Neurologic  Mental status:   orientation to person, place, time and situation   General fund of knowledge:  grossly intact   Memory: Short term and long term intact   Attention intact as able to attend well to the exam     Language fluent in conversation   Comprehension intact; follows simple commands  Cranial nerves:   CN2: Visual Fields full w/o extinction on confrontational testing. He does have some reported right eye blurry vision unilaterally. Left eye vision mildly impaired (shadow) but reports his baseline.   CN 3,4,6: extraocular muscles intact, conjugate. PERRLA.   CN7: upper and lower facial symmetric without dysarthria  CN8: hearing grossly intact to conversation.  Strength: Grasp: BUE 4/5 non focal  BUE: 5/5 throughout.   BLE: 5/5 antigravity. P/D flexion is strong, questionable mild LLE dorsiflexion weakness comparatively. Otherwise non focal.   Deep tendon reflexes:   Technically difficult.   Sensory: light touch intact in all 4 extremities.  No sensory extinction on double simultaneous stimulation  Cerebellar/coordination: finger nose finger normal without ataxia  Tone: normal in all 4 extremities      Labs  Na: 139  K: 4.2  BUN: 18  Creatinine: 1.1  Glucose: 96    Vitamin D: 40.8  Vitamin B12: 432  TSH: 2.20  HbA1c  5.8  ESR: 32  CRP: <3.0    WBC: 6.0  RBC: 4.52  Hgb: 13.2  Hct: 38.5  Platelets: 226        Studies    CT head w/o 11/1/24:  IMPRESSION:  No acute intracranial abnormality.     CTA head/neck 11/1/24:  IMPRESSION:  Unremarkable CTA of the head and neck.      Impression  1. Vision changes; Patient reported fluctuating vision changes over the past several months.  2. Muscle cramping; Patient reported intermittent muscle spasms and twitching for the past few months.  3. BUE weakness with   4. Fatigue  5. Cognitive symptoms  6. Hx of syphillis    Patient presents with multiple of complaints, many intermittent for at least a month. Etiology unclear, at difficult to localize but warrents additional work up.     Symptoms stable, a bit improved. Non toxic appearing. Metabolic work up unrevealing thus far. Mild ESR elevation, CRP WNL.     Hx of syphilis which ID evaluated with no further work up needed.       Recommendations  - Awaiting MRI brain, orbits, C-spine w/ w/o.   - If unrevealing and symptoms persisting will consider LP.   - PT, OT  - Follow up with Ophthalmology as OP.       Jacquelyn Forman, CNP  [facility]    A copy of this note was provided for Manny Esquivel MD

## 2024-11-05 NOTE — PLAN OF CARE
Problem: Discharge Planning  Goal: Discharge to home or other facility with appropriate resources  11/4/2024 2233 by Caron Tao RN  Outcome: Progressing  11/4/2024 0906 by Jeniffer Dunn RN  Outcome: Progressing     Problem: Pain  Goal: Verbalizes/displays adequate comfort level or baseline comfort level  11/4/2024 2233 by Caron Tao RN  Outcome: Progressing  11/4/2024 0906 by Jeniffer Dunn RN  Outcome: Progressing     Problem: Safety - Adult  Goal: Free from fall injury  11/4/2024 2233 by Caron Tao RN  Outcome: Progressing  11/4/2024 0906 by Jeniffer Dunn RN  Outcome: Progressing     Problem: ABCDS Injury Assessment  Goal: Absence of physical injury  11/4/2024 2233 by Caron Tao RN  Outcome: Progressing  11/4/2024 0906 by Jeniffer Dunn RN  Outcome: Progressing     Problem: Neurosensory - Adult  Goal: Achieves stable or improved neurological status  Outcome: Progressing     Problem: Respiratory - Adult  Goal: Achieves optimal ventilation and oxygenation  Outcome: Progressing     Problem: Cardiovascular - Adult  Goal: Maintains optimal cardiac output and hemodynamic stability  Outcome: Progressing     Problem: Gastrointestinal - Adult  Goal: Maintains adequate nutritional intake  Outcome: Progressing     Problem: Infection - Adult  Goal: Absence of infection at discharge  Outcome: Progressing     Problem: Metabolic/Fluid and Electrolytes - Adult  Goal: Electrolytes maintained within normal limits  Outcome: Progressing     Problem: Hematologic - Adult  Goal: Maintains hematologic stability  Outcome: Progressing

## 2024-11-06 ENCOUNTER — APPOINTMENT (OUTPATIENT)
Dept: MRI IMAGING | Age: 54
End: 2024-11-06
Payer: COMMERCIAL

## 2024-11-06 LAB — T PALLIDUM AB SER QL AGGL: REACTIVE

## 2024-11-06 PROCEDURE — 70543 MRI ORBT/FAC/NCK W/O &W/DYE: CPT

## 2024-11-06 PROCEDURE — 6370000000 HC RX 637 (ALT 250 FOR IP): Performed by: INTERNAL MEDICINE

## 2024-11-06 PROCEDURE — 94760 N-INVAS EAR/PLS OXIMETRY 1: CPT

## 2024-11-06 PROCEDURE — 6360000002 HC RX W HCPCS

## 2024-11-06 PROCEDURE — 70553 MRI BRAIN STEM W/O & W/DYE: CPT

## 2024-11-06 PROCEDURE — 2580000003 HC RX 258: Performed by: INTERNAL MEDICINE

## 2024-11-06 PROCEDURE — A9579 GAD-BASE MR CONTRAST NOS,1ML: HCPCS | Performed by: NURSE PRACTITIONER

## 2024-11-06 PROCEDURE — 6360000004 HC RX CONTRAST MEDICATION: Performed by: NURSE PRACTITIONER

## 2024-11-06 PROCEDURE — 72156 MRI NECK SPINE W/O & W/DYE: CPT

## 2024-11-06 PROCEDURE — 1200000000 HC SEMI PRIVATE

## 2024-11-06 PROCEDURE — 94640 AIRWAY INHALATION TREATMENT: CPT

## 2024-11-06 RX ORDER — LORAZEPAM 2 MG/ML
1 INJECTION INTRAMUSCULAR ONCE
Status: COMPLETED | OUTPATIENT
Start: 2024-11-06 | End: 2024-11-06

## 2024-11-06 RX ADMIN — PANTOPRAZOLE SODIUM 40 MG: 40 TABLET, DELAYED RELEASE ORAL at 06:01

## 2024-11-06 RX ADMIN — LORAZEPAM 1 MG: 2 INJECTION INTRAMUSCULAR; INTRAVENOUS at 12:00

## 2024-11-06 RX ADMIN — TIOTROPIUM BROMIDE AND OLODATEROL 2 PUFF: 3.124; 2.736 SPRAY, METERED RESPIRATORY (INHALATION) at 08:13

## 2024-11-06 RX ADMIN — LORAZEPAM 1 MG: 2 INJECTION INTRAMUSCULAR; INTRAVENOUS at 12:21

## 2024-11-06 RX ADMIN — FLUTICASONE PROPIONATE 2 SPRAY: 50 SPRAY, METERED NASAL at 09:04

## 2024-11-06 RX ADMIN — GADOTERIDOL 17 ML: 279.3 INJECTION, SOLUTION INTRAVENOUS at 13:07

## 2024-11-06 RX ADMIN — SODIUM CHLORIDE, PRESERVATIVE FREE 10 ML: 5 INJECTION INTRAVENOUS at 09:04

## 2024-11-06 RX ADMIN — CETIRIZINE HYDROCHLORIDE 10 MG: 10 TABLET, FILM COATED ORAL at 09:04

## 2024-11-06 RX ADMIN — SODIUM CHLORIDE, PRESERVATIVE FREE 10 ML: 5 INJECTION INTRAVENOUS at 20:59

## 2024-11-06 RX ADMIN — LISINOPRIL 10 MG: 10 TABLET ORAL at 09:04

## 2024-11-06 NOTE — PROGRESS NOTES
Pt transported to MRI, this RN administered Ativan per MAR. No needs at this time.    Electronically signed by Flor Mock RN on 11/6/2024 at 1:16 PM

## 2024-11-06 NOTE — PROGRESS NOTES
Pt back from MRI, VSS, denies pain. No needs at this time.    Electronically signed by Flor Mock RN on 11/6/2024 at 2:11 PM

## 2024-11-06 NOTE — PLAN OF CARE
Problem: Discharge Planning  Goal: Discharge to home or other facility with appropriate resources  Outcome: Progressing     Problem: Pain  Goal: Verbalizes/displays adequate comfort level or baseline comfort level  Outcome: Progressing     Problem: Safety - Adult  Goal: Free from fall injury  Outcome: Progressing     Problem: ABCDS Injury Assessment  Goal: Absence of physical injury  Outcome: Progressing     Problem: Neurosensory - Adult  Goal: Achieves stable or improved neurological status  Outcome: Progressing     Problem: Respiratory - Adult  Goal: Achieves optimal ventilation and oxygenation  Outcome: Progressing     Problem: Cardiovascular - Adult  Goal: Maintains optimal cardiac output and hemodynamic stability  Outcome: Progressing     Problem: Gastrointestinal - Adult  Goal: Maintains adequate nutritional intake  Outcome: Progressing     Problem: Infection - Adult  Goal: Absence of infection at discharge  Outcome: Progressing     Problem: Metabolic/Fluid and Electrolytes - Adult  Goal: Electrolytes maintained within normal limits  Outcome: Progressing     Problem: Hematologic - Adult  Goal: Maintains hematologic stability  Outcome: Progressing

## 2024-11-06 NOTE — PROGRESS NOTES
Name:  Eh Price /Age/Sex: 1970  (53 y.o. male)   MRN & CSN:  [id number] & [id number] Encounter Date/Time: 2024 1:15 PM EST   Location:  [id number] PCP: Ernestina Anne PA-C     Attending:Manny Kinney MD       Eh Price is a 53 y.o. male with  has a past medical history of Asymmetrical sensorineural hearing loss, Environmental allergies, GERD (gastroesophageal reflux disease), Hypertension, Migraine headache, and Mixed anxiety and depressive disorder. who presents with Myoclonic jerking    Hospital Day: 6      Interval history:     Seen and examined at bedside. No acute events overnight. Not able to get MRI done yesterday due to having anxiety. Plan for MRI today with IV ativan as needed    Review of Systems:      10 point ROS negative except stated above    Objective:     Intake/Output Summary (Last 24 hours) at 2024 1225  Last data filed at 2024 0611  Gross per 24 hour   Intake 605 ml   Output --   Net 605 ml      Vitals:   Vitals:    24 1956 24 0600 24 0813 24 1100   BP: 126/87   138/89   Pulse: 71  66 75   Resp: 20  16 18   Temp: 97.9 °F (36.6 °C)   97.4 °F (36.3 °C)   TempSrc: Oral   Oral   SpO2: 95%  99% 100%   Weight:  89.5 kg (197 lb 5 oz)     Height:             Physical Exam:        General: NAD  Eyes: EOMI  ENT: neck supple  Cardiovascular: Regular rate.  Respiratory: Clear to auscultation  Gastrointestinal: Soft, non tender  Genitourinary: no suprapubic tenderness  Musculoskeletal: No edema  Neuro: Alert, no focal weakness  Psych: Mood appropriate.       Assessment and Plan     Suspected myoclonic jerks : Patient presented with multiple complaints. CT and CTA head and neck negative. Plan for MRI brain and orbits today with sedation. Neurology following - might need spinal workup if imaging is unrevealing. Will need outpatient ophthalmology follow up for vision changes    History of syphilis treated in  : ID following - recommended no

## 2024-11-06 NOTE — PLAN OF CARE
Problem: Discharge Planning  Goal: Discharge to home or other facility with appropriate resources  11/5/2024 2131 by Caron Tao RN  Outcome: Progressing  11/5/2024 0803 by Jeniffer Dunn RN  Outcome: Progressing     Problem: Pain  Goal: Verbalizes/displays adequate comfort level or baseline comfort level  11/5/2024 2131 by Caron Tao RN  Outcome: Progressing  11/5/2024 0803 by Jeniffer Dunn RN  Outcome: Progressing     Problem: Safety - Adult  Goal: Free from fall injury  11/5/2024 2131 by Caron Tao RN  Outcome: Progressing  11/5/2024 0803 by Jeniffer Dunn RN  Outcome: Progressing     Problem: ABCDS Injury Assessment  Goal: Absence of physical injury  11/5/2024 2131 by Caron Tao RN  Outcome: Progressing  11/5/2024 0803 by Jeniffer Dunn RN  Outcome: Progressing     Problem: Neurosensory - Adult  Goal: Achieves stable or improved neurological status  11/5/2024 2131 by Caron Tao RN  Outcome: Progressing  11/5/2024 0803 by Jeniffer Dunn RN  Outcome: Progressing     Problem: Respiratory - Adult  Goal: Achieves optimal ventilation and oxygenation  11/5/2024 2131 by Caron Tao RN  Outcome: Progressing  11/5/2024 0803 by Jeniffer Dunn RN  Outcome: Progressing     Problem: Cardiovascular - Adult  Goal: Maintains optimal cardiac output and hemodynamic stability  11/5/2024 2131 by Caron Tao RN  Outcome: Progressing  11/5/2024 0803 by Jeniffer Dunn RN  Outcome: Progressing     Problem: Gastrointestinal - Adult  Goal: Maintains adequate nutritional intake  11/5/2024 2131 by Caron Tao RN  Outcome: Progressing  11/5/2024 0803 by Jeniffer Dunn RN  Outcome: Progressing     Problem: Infection - Adult  Goal: Absence of infection at discharge  11/5/2024 2131 by Caron Tao RN  Outcome: Progressing  11/5/2024 0803 by Jeniffer Dunn RN  Outcome: Progressing     Problem: Metabolic/Fluid and Electrolytes - Adult  Goal: Electrolytes maintained

## 2024-11-07 ENCOUNTER — APPOINTMENT (OUTPATIENT)
Dept: INTERVENTIONAL RADIOLOGY/VASCULAR | Age: 54
End: 2024-11-07
Payer: COMMERCIAL

## 2024-11-07 LAB
ANION GAP SERPL CALCULATED.3IONS-SCNC: 10 MMOL/L (ref 3–16)
APPEARANCE CSF: CLEAR
BASOPHILS # BLD: 0.1 K/UL (ref 0–0.2)
BASOPHILS NFR BLD: 1.3 %
BUN SERPL-MCNC: 16 MG/DL (ref 7–20)
CALCIUM SERPL-MCNC: 9.2 MG/DL (ref 8.3–10.6)
CHLORIDE SERPL-SCNC: 102 MMOL/L (ref 99–110)
CLOT EVALUATION CSF: NORMAL
CO2 SERPL-SCNC: 22 MMOL/L (ref 21–32)
COLOR CSF: COLORLESS
CREAT SERPL-MCNC: 1 MG/DL (ref 0.9–1.3)
DEPRECATED RDW RBC AUTO: 15.2 % (ref 12.4–15.4)
EOSINOPHIL # BLD: 0.5 K/UL (ref 0–0.6)
EOSINOPHIL NFR BLD: 5.2 %
GFR SERPLBLD CREATININE-BSD FMLA CKD-EPI: 88 ML/MIN/{1.73_M2}
GLUCOSE CSF-MCNC: 58 MG/DL (ref 40–80)
GLUCOSE SERPL-MCNC: 152 MG/DL (ref 70–99)
HCT VFR BLD AUTO: 40.6 % (ref 40.5–52.5)
HGB BLD-MCNC: 13.7 G/DL (ref 13.5–17.5)
LYMPHOCYTES # BLD: 3.1 K/UL (ref 1–5.1)
LYMPHOCYTES NFR BLD: 35.6 %
MANUAL DIF COMMENT CSF-IMP: NORMAL
MCH RBC QN AUTO: 29 PG (ref 26–34)
MCHC RBC AUTO-ENTMCNC: 33.6 G/DL (ref 31–36)
MCV RBC AUTO: 86.3 FL (ref 80–100)
MENING+ENC PNL CSF NAA+NON-PROBE: NORMAL
MONOCYTES # BLD: 0.7 K/UL (ref 0–1.3)
MONOCYTES NFR BLD: 7.8 %
NEUTROPHILS # BLD: 4.4 K/UL (ref 1.7–7.7)
NEUTROPHILS NFR BLD: 50.1 %
NUC CELL # FLD MANUAL: 0 /CUMM (ref 0–5)
PLATELET # BLD AUTO: 262 K/UL (ref 135–450)
PMV BLD AUTO: 8.4 FL (ref 5–10.5)
POTASSIUM SERPL-SCNC: 3.9 MMOL/L (ref 3.5–5.1)
PROT CSF-MCNC: 54 MG/DL (ref 15–45)
RBC # BLD AUTO: 4.7 M/UL (ref 4.2–5.9)
RBC # FLD MANUAL: 0 /CUMM
REPORT: NORMAL
SODIUM SERPL-SCNC: 134 MMOL/L (ref 136–145)
TUBE # CSF: NORMAL
VIT B1 SERPL-MCNC: 5 NMOL/L (ref 4–15)
WBC # BLD AUTO: 8.7 K/UL (ref 4–11)

## 2024-11-07 PROCEDURE — 36415 COLL VENOUS BLD VENIPUNCTURE: CPT

## 2024-11-07 PROCEDURE — 87483 CNS DNA AMP PROBE TYPE 12-25: CPT

## 2024-11-07 PROCEDURE — 2580000003 HC RX 258: Performed by: INTERNAL MEDICINE

## 2024-11-07 PROCEDURE — 80048 BASIC METABOLIC PNL TOTAL CA: CPT

## 2024-11-07 PROCEDURE — 86341 ISLET CELL ANTIBODY: CPT

## 2024-11-07 PROCEDURE — 6370000000 HC RX 637 (ALT 250 FOR IP): Performed by: INTERNAL MEDICINE

## 2024-11-07 PROCEDURE — 1200000000 HC SEMI PRIVATE

## 2024-11-07 PROCEDURE — 87070 CULTURE OTHR SPECIMN AEROBIC: CPT

## 2024-11-07 PROCEDURE — 82945 GLUCOSE OTHER FLUID: CPT

## 2024-11-07 PROCEDURE — 86592 SYPHILIS TEST NON-TREP QUAL: CPT

## 2024-11-07 PROCEDURE — 87205 SMEAR GRAM STAIN: CPT

## 2024-11-07 PROCEDURE — 84157 ASSAY OF PROTEIN OTHER: CPT

## 2024-11-07 PROCEDURE — 94640 AIRWAY INHALATION TREATMENT: CPT

## 2024-11-07 PROCEDURE — 94760 N-INVAS EAR/PLS OXIMETRY 1: CPT

## 2024-11-07 PROCEDURE — 89050 BODY FLUID CELL COUNT: CPT

## 2024-11-07 PROCEDURE — 2709999900 IR LUMBAR PUNCTURE FOR DIAGNOSIS

## 2024-11-07 PROCEDURE — 86255 FLUORESCENT ANTIBODY SCREEN: CPT

## 2024-11-07 PROCEDURE — 62328 DX LMBR SPI PNXR W/FLUOR/CT: CPT

## 2024-11-07 PROCEDURE — 85025 COMPLETE CBC W/AUTO DIFF WBC: CPT

## 2024-11-07 RX ADMIN — TIOTROPIUM BROMIDE AND OLODATEROL 2 PUFF: 3.124; 2.736 SPRAY, METERED RESPIRATORY (INHALATION) at 08:37

## 2024-11-07 RX ADMIN — LISINOPRIL 10 MG: 10 TABLET ORAL at 09:01

## 2024-11-07 RX ADMIN — FLUTICASONE PROPIONATE 2 SPRAY: 50 SPRAY, METERED NASAL at 09:01

## 2024-11-07 RX ADMIN — SODIUM CHLORIDE, PRESERVATIVE FREE 10 ML: 5 INJECTION INTRAVENOUS at 19:22

## 2024-11-07 RX ADMIN — CETIRIZINE HYDROCHLORIDE 10 MG: 10 TABLET, FILM COATED ORAL at 09:01

## 2024-11-07 RX ADMIN — PANTOPRAZOLE SODIUM 40 MG: 40 TABLET, DELAYED RELEASE ORAL at 05:27

## 2024-11-07 RX ADMIN — SODIUM CHLORIDE, PRESERVATIVE FREE 10 ML: 5 INJECTION INTRAVENOUS at 09:01

## 2024-11-07 NOTE — PROGRESS NOTES
Progress Note  The patient is being evaluated for myoclonic jerking, memory loss, word finding difficulty    Updates  Vision continues to improve: left eye back to normal. Right eye still with blurriness.   Feels less confused then he was PTA. No episodes of confusion. He does feel like he has some difficulty at times getting words out.   Hand strength is back to normal.     MRI's completed.    Case discussed with neurology attending.     Active Ambulatory Problems     Diagnosis Date Noted    Left sided numbness 03/21/2024     Resolved Ambulatory Problems     Diagnosis Date Noted    No Resolved Ambulatory Problems     Past Medical History:   Diagnosis Date    Asymmetrical sensorineural hearing loss     Environmental allergies     GERD (gastroesophageal reflux disease)     Hypertension     Migraine headache     Mixed anxiety and depressive disorder        Current Facility-Administered Medications:     LORazepam (ATIVAN) injection 0.5 mg, 0.5 mg, IntraVENous, Once, Manny Kinney MD    sodium chloride flush 0.9 % injection 5-40 mL, 5-40 mL, IntraVENous, 2 times per day, Isabel Green MD, 10 mL at 11/07/24 0901    sodium chloride flush 0.9 % injection 5-40 mL, 5-40 mL, IntraVENous, PRN, Isabel Green MD    0.9 % sodium chloride infusion, , IntraVENous, PRN, Isabel Green MD    potassium chloride (KLOR-CON M) extended release tablet 40 mEq, 40 mEq, Oral, PRN **OR** potassium bicarb-citric acid (EFFER-K) effervescent tablet 40 mEq, 40 mEq, Oral, PRN **OR** potassium chloride 10 mEq/100 mL IVPB (Peripheral Line), 10 mEq, IntraVENous, PRN, Isabel Green MD    magnesium sulfate 2000 mg in 50 mL IVPB premix, 2,000 mg, IntraVENous, PRN, Isabel Green MD    LORazepam (ATIVAN) injection 4 mg, 4 mg, IntraVENous, Q5 Min PRN, Isabel Green MD    [Held by provider] enoxaparin (LOVENOX) injection 40 mg, 40 mg, SubCUTAneous, Daily, Isabel Green MD, 40 mg at 11/04/24 0902       No abnormality seen the orbits.     MRI Cervical spine  w/ w/o 11/6/24: Independently reviewed.   IMPRESSION:  1. Anterior fusion without complication C5-C6 and C6-C7.  2. Multilevel degenerative disc disease and facet arthropathy with varying  degrees of central stenosis and foraminal narrowing.  Negative for large  protrusion, high-grade central stenosis, cord impingement.  Moderate to  high-grade foraminal narrowing C4-C5.  3. Correlation may be helpful to determine which findings/levels are most  clinically significant.    CT head w/o 11/1/24:  IMPRESSION:  No acute intracranial abnormality.     CTA head/neck 11/1/24:  IMPRESSION:  Unremarkable CTA of the head and neck.      Impression  1. Vision changes; Patient reported fluctuating vision changes over the past several months.  2. Muscle cramping; Patient reported intermittent muscle spasms and twitching for the past few months.  3. BUE weakness with   4. Fatigue  5. Cognitive symptoms  6. Hx of syphillis    Patient presents with multiple of complaints, many intermittent for at least a month. Etiology unclear, at difficult to localize but warrents additional work up.     Symptoms stable, and improved. Non toxic appearing. Metabolic work up unrevealing thus far. Mild ESR elevation, CRP WNL.     MRI Brain , orbits, and Cervical spine without etiology for his symptoms.     Hx of syphilis which ID evaluated with no further work up needed.Given hx will further evaluate/rule out CNS syphilis, and other autoimmune process.          Recommendations  - Per neurology attending; proceed with LP: Will check cell count, protein, glucose, meningitis panel, encephaloapthy panel, VDRL, routine culture. (Ordered)   - Primary team to hold and resume necessary medications as previously discussed.   - PT, OT  - Follow up with Ophthalmology as OP.   - Can follow up with Neurosurgery as OP PRN for moderate to severe foraminal narrowing.    - Follow up with Dr. Welsh in 3-4

## 2024-11-07 NOTE — PLAN OF CARE
Problem: Discharge Planning  Goal: Discharge to home or other facility with appropriate resources  11/6/2024 1314 by Flor Mock RN  Outcome: Progressing     Problem: Pain  Goal: Verbalizes/displays adequate comfort level or baseline comfort level  11/6/2024 1955 by Merly Isaacs RN  Outcome: Progressing  11/6/2024 1314 by Flor Mock RN  Outcome: Progressing     Problem: Safety - Adult  Goal: Free from fall injury  11/6/2024 1955 by Merly Isaacs RN  Outcome: Progressing  11/6/2024 1314 by Flor Mock RN  Outcome: Progressing     Problem: ABCDS Injury Assessment  Goal: Absence of physical injury  11/6/2024 1955 by Merly Isaacs RN  Outcome: Progressing  11/6/2024 1314 by Flor Mock RN  Outcome: Progressing     Problem: Neurosensory - Adult  Goal: Achieves stable or improved neurological status  11/6/2024 1955 by Merly Isaacs RN  Outcome: Progressing  11/6/2024 1314 by Flor Mock RN  Outcome: Progressing     Problem: Respiratory - Adult  Goal: Achieves optimal ventilation and oxygenation  11/6/2024 1955 by Merly Isaacs RN  Outcome: Progressing  11/6/2024 1314 by Flor Mock RN  Outcome: Progressing     Problem: Cardiovascular - Adult  Goal: Maintains optimal cardiac output and hemodynamic stability  11/6/2024 1955 by Merly Isaacs RN  Outcome: Progressing  11/6/2024 1314 by Flor Mock RN  Outcome: Progressing     Problem: Gastrointestinal - Adult  Goal: Maintains adequate nutritional intake  11/6/2024 1955 by Merly Isaacs RN  Outcome: Progressing  11/6/2024 1314 by Flor Mock RN  Outcome: Progressing     Problem: Infection - Adult  Goal: Absence of infection at discharge  11/6/2024 1955 by Merly Isaacs RN  Outcome: Progressing  11/6/2024 1314 by Flor Mock RN  Outcome: Progressing     Problem: Metabolic/Fluid and Electrolytes - Adult  Goal: Electrolytes maintained within normal limits  11/6/2024 1955 by Merly Isaacs RN  Outcome: Progressing  11/6/2024

## 2024-11-07 NOTE — PROGRESS NOTES
Name:  Eh Price /Age/Sex: 1970  (53 y.o. male)   MRN & CSN:  [id number] & [id number] Encounter Date/Time: 2024 1:15 PM EST   Location:  [id number] PCP: Ernestina Anne PA-C     Attending:Manny Kinney MD       Eh Price is a 53 y.o. male with  has a past medical history of Asymmetrical sensorineural hearing loss, Environmental allergies, GERD (gastroesophageal reflux disease), Hypertension, Migraine headache, and Mixed anxiety and depressive disorder. who presents with Myoclonic jerking    Hospital Day: 7      Interval history:     Seen and examined at bedside. No acute events overnight. MRI negative, plan for lumbar puncture today     Review of Systems:      10 point ROS negative except stated above    Objective:     Intake/Output Summary (Last 24 hours) at 2024 1323  Last data filed at 2024 205  Gross per 24 hour   Intake 480 ml   Output --   Net 480 ml      Vitals:   Vitals:    24 2046 24 0515 24 0730 24 0838   BP: 125/84  136/89    Pulse: 70  82    Resp: 20  18    Temp: 97.9 °F (36.6 °C)  97.4 °F (36.3 °C)    TempSrc: Oral  Oral    SpO2: 97%  95% 96%   Weight:  88.5 kg (195 lb 1.7 oz)     Height:             Physical Exam:        General: NAD  Eyes: EOMI  ENT: neck supple  Cardiovascular: Regular rate.  Respiratory: Clear to auscultation  Gastrointestinal: Soft, non tender  Genitourinary: no suprapubic tenderness  Musculoskeletal: No edema  Neuro: Alert, no focal weakness  Psych: Mood appropriate.       Assessment and Plan     Suspected myoclonic jerks : Patient presented with multiple complaints. CT and CTA head and neck negative. MRI brain and orbits negative Neurology following, plan for lumbar puncture today. Will need outpatient ophthalmology follow up for vision changes    History of syphilis treated in  : ID following - recommended no further workup.     Essential hypertension : Continue home medications    DVT prophylaxis :  PM    UROBILINOGEN 0.2 11/01/2024 08:15 PM    BILIRUBINUR Negative 11/01/2024 08:15 PM    BLOODU Negative 11/01/2024 08:15 PM    GLUCOSEU Negative 11/01/2024 08:15 PM    KETUA 15 11/01/2024 08:15 PM     Urine Cultures: No results found for: \"LABURIN\"  Blood Cultures: No results found for: \"BC\"  No results found for: \"BLOODCULT2\"  Organism: No results found for: \"ORG\"    Medical decision making:     Myoclonic jerks  placing patient at risk of multiple comorbidities including threat to bodily function    Drugs that require monitoring for toxicity include lovenox and the method of monitoring was H&H for anemia    Discussed management with RN and discharge planning options with Case Management   CTA head and neck as interpreted by me showing unremarkable  Personally reviewed lab test results  Personally reviewed studies   Reviewed prior external records in detail  Reviewed nursing notes overnight    Total time spent : >55 min of which >50% of the time was spent counseling/coordination of care and majority of the time involved discussion of test results, diagnostic or treatment recommendations, prognosis, risks and benefits of management options, instructions, education, compliance or risk factor reduction. The time includes time at the bedside, data interpretation, medication management, monitoring for potential decompensation and physician consultations.     Overall the complexity of medical decision making was high    Gregoria Bryant MD

## 2024-11-08 VITALS
SYSTOLIC BLOOD PRESSURE: 145 MMHG | TEMPERATURE: 98.8 F | BODY MASS INDEX: 28.83 KG/M2 | HEIGHT: 69 IN | WEIGHT: 194.67 LBS | RESPIRATION RATE: 18 BRPM | DIASTOLIC BLOOD PRESSURE: 88 MMHG | HEART RATE: 75 BPM | OXYGEN SATURATION: 96 %

## 2024-11-08 LAB
ANION GAP SERPL CALCULATED.3IONS-SCNC: 10 MMOL/L (ref 3–16)
BACTERIA CSF CULT: NORMAL
BUN SERPL-MCNC: 16 MG/DL (ref 7–20)
CALCIUM SERPL-MCNC: 10 MG/DL (ref 8.3–10.6)
CHLORIDE SERPL-SCNC: 102 MMOL/L (ref 99–110)
CO2 SERPL-SCNC: 26 MMOL/L (ref 21–32)
CREAT SERPL-MCNC: 1.1 MG/DL (ref 0.9–1.3)
DEPRECATED RDW RBC AUTO: 15.4 % (ref 12.4–15.4)
GFR SERPLBLD CREATININE-BSD FMLA CKD-EPI: 79 ML/MIN/{1.73_M2}
GLUCOSE SERPL-MCNC: 92 MG/DL (ref 70–99)
GRAM STN SPEC: NORMAL
HCT VFR BLD AUTO: 43.2 % (ref 40.5–52.5)
HGB BLD-MCNC: 14.2 G/DL (ref 13.5–17.5)
MCH RBC QN AUTO: 28.3 PG (ref 26–34)
MCHC RBC AUTO-ENTMCNC: 32.8 G/DL (ref 31–36)
MCV RBC AUTO: 86.3 FL (ref 80–100)
PLATELET # BLD AUTO: 271 K/UL (ref 135–450)
PMV BLD AUTO: 8.3 FL (ref 5–10.5)
POTASSIUM SERPL-SCNC: 4.4 MMOL/L (ref 3.5–5.1)
RBC # BLD AUTO: 5 M/UL (ref 4.2–5.9)
SODIUM SERPL-SCNC: 138 MMOL/L (ref 136–145)
WBC # BLD AUTO: 8.2 K/UL (ref 4–11)

## 2024-11-08 PROCEDURE — 2580000003 HC RX 258: Performed by: INTERNAL MEDICINE

## 2024-11-08 PROCEDURE — 80048 BASIC METABOLIC PNL TOTAL CA: CPT

## 2024-11-08 PROCEDURE — 94640 AIRWAY INHALATION TREATMENT: CPT

## 2024-11-08 PROCEDURE — 36415 COLL VENOUS BLD VENIPUNCTURE: CPT

## 2024-11-08 PROCEDURE — 6370000000 HC RX 637 (ALT 250 FOR IP): Performed by: INTERNAL MEDICINE

## 2024-11-08 PROCEDURE — 94760 N-INVAS EAR/PLS OXIMETRY 1: CPT

## 2024-11-08 PROCEDURE — 85027 COMPLETE CBC AUTOMATED: CPT

## 2024-11-08 RX ADMIN — SODIUM CHLORIDE, PRESERVATIVE FREE 10 ML: 5 INJECTION INTRAVENOUS at 09:30

## 2024-11-08 RX ADMIN — PANTOPRAZOLE SODIUM 40 MG: 40 TABLET, DELAYED RELEASE ORAL at 06:59

## 2024-11-08 RX ADMIN — ACETAMINOPHEN 325MG 650 MG: 325 TABLET ORAL at 02:04

## 2024-11-08 RX ADMIN — LISINOPRIL 10 MG: 10 TABLET ORAL at 09:29

## 2024-11-08 RX ADMIN — CETIRIZINE HYDROCHLORIDE 10 MG: 10 TABLET, FILM COATED ORAL at 09:29

## 2024-11-08 RX ADMIN — FLUTICASONE PROPIONATE 2 SPRAY: 50 SPRAY, METERED NASAL at 09:29

## 2024-11-08 RX ADMIN — TIOTROPIUM BROMIDE AND OLODATEROL 2 PUFF: 3.124; 2.736 SPRAY, METERED RESPIRATORY (INHALATION) at 08:10

## 2024-11-08 ASSESSMENT — PAIN SCALES - GENERAL: PAINLEVEL_OUTOF10: 2

## 2024-11-08 ASSESSMENT — PAIN DESCRIPTION - DESCRIPTORS: DESCRIPTORS: ACHING

## 2024-11-08 ASSESSMENT — PAIN - FUNCTIONAL ASSESSMENT: PAIN_FUNCTIONAL_ASSESSMENT: ACTIVITIES ARE NOT PREVENTED

## 2024-11-08 ASSESSMENT — PAIN DESCRIPTION - LOCATION: LOCATION: HEAD

## 2024-11-08 NOTE — PROGRESS NOTES
Discharge orders acknowledged by RN. Discharge teaching completed with patient. Patient's IV removed. AVS reviewed and all questions answered. Medication schedule reviewed and patient understands regimen. All follow up appointments reviewed with patient. Patient discharged with all belongings. Patient independent and walked with RN to ED. Patient's car parked in ED parking lot. He will drive himself home. They did not voice any questions or concerns.

## 2024-11-08 NOTE — PROGRESS NOTES
Brief Neurology note:     Patient being discharged.   Preliminary CSF studies reviewed: 0 nucleated cells. Glucose WNL.  Mild protein elevation which is non specific and could be baseline. Meningitis panel negative.     Additional CSF studies remain pending as previously discussed with patient he will need to follow up as OP for results.     Please call with any questions or concerns.     Jacquelyn Forman CNP  Neurology.

## 2024-11-08 NOTE — PLAN OF CARE
Problem: Discharge Planning  Goal: Discharge to home or other facility with appropriate resources  11/7/2024 1908 by Merly Isaacs RN  Outcome: Progressing  11/7/2024 1036 by James Reyes RN  Outcome: Progressing     Problem: Pain  Goal: Verbalizes/displays adequate comfort level or baseline comfort level  11/7/2024 1908 by Merly Isaacs RN  Outcome: Progressing  11/7/2024 1036 by James Reyes RN  Outcome: Progressing     Problem: Safety - Adult  Goal: Free from fall injury  11/7/2024 1908 by Merly Isaacs RN  Outcome: Progressing  11/7/2024 1036 by James Reyes RN  Outcome: Progressing     Problem: ABCDS Injury Assessment  Goal: Absence of physical injury  11/7/2024 1908 by Merly Isaacs RN  Outcome: Progressing  11/7/2024 1036 by James Reyes RN  Outcome: Progressing     Problem: Neurosensory - Adult  Goal: Achieves stable or improved neurological status  11/7/2024 1908 by Merly Isaacs RN  Outcome: Progressing  11/7/2024 1036 by James Reyes RN  Outcome: Progressing     Problem: Respiratory - Adult  Goal: Achieves optimal ventilation and oxygenation  11/7/2024 1908 by Merly Isaacs RN  Outcome: Progressing  11/7/2024 1036 by James Reyes RN  Outcome: Progressing     Problem: Cardiovascular - Adult  Goal: Maintains optimal cardiac output and hemodynamic stability  11/7/2024 1908 by Merly Isaacs RN  Outcome: Progressing  11/7/2024 1036 by James Reyes RN  Outcome: Progressing     Problem: Gastrointestinal - Adult  Goal: Maintains adequate nutritional intake  11/7/2024 1908 by Merly Isaacs RN  Outcome: Progressing  11/7/2024 1036 by James Reyes RN  Outcome: Progressing     Problem: Infection - Adult  Goal: Absence of infection at discharge  11/7/2024 1908 by Merly Isaacs RN  Outcome: Progressing  11/7/2024 1036 by James Reyes RN  Outcome: Progressing     Problem: Metabolic/Fluid and Electrolytes - Adult  Goal: Electrolytes maintained within normal limits  11/7/2024 1908 by  Merly Isaacs, RN  Outcome: Progressing  11/7/2024 1036 by James Reyes, RN  Outcome: Progressing     Problem: Hematologic - Adult  Goal: Maintains hematologic stability  11/7/2024 1908 by Merly Isaacs RN  Outcome: Progressing  11/7/2024 1036 by James Reyes, RN  Outcome: Progressing

## 2024-11-08 NOTE — PROGRESS NOTES
4 Eyes Skin Assessment     NAME:  Eh Price  YOB: 1970  MEDICAL RECORD NUMBER:  [id number]    The patient is being assessed for  Other S/P LP    I agree that at least one RN has performed a thorough Head to Toe Skin Assessment on the patient. ALL assessment sites listed below have been assessed.      Areas assessed by both nurses:    Head, Face, Ears, Shoulders, Back, Chest, Arms, Elbows, Hands, Sacrum. Buttock, Coccyx, Ischium, and Legs. Feet and Heels        Does the Patient have a Wound? No noted wound(s)       Miki Prevention initiated by RN: No  Wound Care Orders initiated by RN: No    Pressure Injury (Stage 3,4, Unstageable, DTI, NWPT, and Complex wounds) if present, place Wound referral order by RN under : No    New Ostomies, if present place, Ostomy referral order under : No     Nurse 1 eSignature: Electronically signed by James Reyes RN on 11/7/24 at 7:24 PM EST    **SHARE this note so that the co-signing nurse can place an eSignature**    Nurse 2 eSignature: Electronically signed by NOLBERTO LOZANO RN on 11/7/24 at 10:24 PM EST

## 2024-11-09 LAB — VDRL CSF QL: NON REACTIVE

## 2024-11-11 NOTE — DISCHARGE SUMMARY
Name:  Eh Price /Age/Sex: 1970 (53 y.o. male)   Admit Date: 2024  Discharge Date: 2024    MRN & CSN:  [id number] & [id number] Encounter Date : 2024    Attending:  No att. providers found Discharging Provider: Manny Kinney MD       Hospital Course:      Eh Price is a 53 y.o. male who presented with     Chief Complaint   Patient presents with    Neurologic Problem     Pt states his doctor sent him here because he has been having neurological problems lately- muscle spasms/clenching, R vision blurriness/changes, confusion, headaches. Pt states this all started in march when he was admitted for complex migraines, but in July it started to get worse. Pt states he gets a numbness/tingling sensation in back on his neck when he bends his head downward. Denies chest pain, n/v, SOB. Pt states he has pain in R hand and neck at the moment. Pt A&O x4 and gait steady in triage        The patient was being managed in the hospital for    Suspected myoclonic jerks : Patient presented with multiple complaints. CT and CTA head and neck negative. MRI brain and orbits negative Neurology following, LP showing : 0 nucleated cells. Glucose WNL.  Mild protein elevation which is non specific and could be baseline. Meningitis panel negative. Additional CSF studies remain pending as previously discussed with patient he will need to follow up as OP for results.   Will need outpatient ophthalmology follow up for vision changes     History of syphilis treated in 2014 : ID following - recommended no further workup as per ID    The patient expressed appropriate understanding of, and agreement with the discharge recommendations, medications, and plan.     Follow up appointments :  Primary care physician: Ernestina Anne PA-C within 1 week, ophthalmology in 1 week, neurology in 1 week to discuss LP findings    Consults this admission:  IP CONSULT TO HOSPITALIST  IP CONSULT TO NEUROLOGY  IP CONSULT TO INFECTIOUS

## 2024-11-14 LAB
BACTERIA CSF CULT: NORMAL
GRAM STN SPEC: NORMAL

## 2024-11-19 LAB — REPORT: NORMAL

## 2024-12-18 ENCOUNTER — OFFICE VISIT (OUTPATIENT)
Dept: ENT CLINIC | Age: 54
End: 2024-12-18
Payer: COMMERCIAL

## 2024-12-18 VITALS
HEART RATE: 64 BPM | TEMPERATURE: 97.9 F | BODY MASS INDEX: 30.21 KG/M2 | DIASTOLIC BLOOD PRESSURE: 87 MMHG | SYSTOLIC BLOOD PRESSURE: 144 MMHG | WEIGHT: 204 LBS | OXYGEN SATURATION: 96 % | HEIGHT: 69 IN

## 2024-12-18 DIAGNOSIS — J30.2 SEASONAL ALLERGIES: Chronic | ICD-10-CM

## 2024-12-18 DIAGNOSIS — R09.81 NASAL CONGESTION: Chronic | ICD-10-CM

## 2024-12-18 DIAGNOSIS — J34.2 NASAL SEPTAL DEVIATION: Primary | Chronic | ICD-10-CM

## 2024-12-18 PROCEDURE — 3077F SYST BP >= 140 MM HG: CPT | Performed by: OTOLARYNGOLOGY

## 2024-12-18 PROCEDURE — 99214 OFFICE O/P EST MOD 30 MIN: CPT | Performed by: OTOLARYNGOLOGY

## 2024-12-18 PROCEDURE — 3079F DIAST BP 80-89 MM HG: CPT | Performed by: OTOLARYNGOLOGY

## 2024-12-18 RX ORDER — GABAPENTIN 100 MG/1
100 CAPSULE ORAL 3 TIMES DAILY
COMMUNITY
Start: 2024-12-05 | End: 2025-06-03

## 2024-12-18 RX ORDER — FLUTICASONE PROPIONATE AND SALMETEROL 100; 50 UG/1; UG/1
POWDER RESPIRATORY (INHALATION)
COMMUNITY
Start: 2024-11-21

## 2024-12-18 RX ORDER — TIZANIDINE 2 MG/1
TABLET ORAL
COMMUNITY

## 2024-12-18 NOTE — PROGRESS NOTES
Chief Complaint   Patient presents with    Nasal deviated septum    Nasal Congestion       HISTORY:   \"The medicine had minimal effect.  I'm basically the same.\"        EXAMINATION:  WDWN, NAD.  Nose:  moderate rightward NSD, with left deflection of the caudal margin and left ITH.      COUNSELING FOR SEPTAL RECONSTRUCTION AND INFERIOR TURBINATE REDUCTION:  I discussed at length the anatomic obstruction due to nasal septal deviation and turbinate hypertrophy and congestion.  I advised that in my opinion, the nasal airway obstruction, and resultant nasal dyspnea, is, in great part, due to these anatomic abnormalities and that surgery has a high likelihood of improving the nasal airway and ameliorating the nasal obstruction and dyspnea.  Any problems due to allergic rhinitis will persist, however.  Eh was advised of the recommended surgery/procedure.  Eh stated the desire to proceed with surgery.  Eh chose general anesthesia over local anesthesia with IV sedation.  Eh understands this will be done under general anesthesia.  The surgical procedure was described.  I discussed the surgical technique and the usual post operative course.  I discussed the possibility of persistent or recurrent septal deviation, turbinate hypertrophy and/or nasal airway obstruction and dyspnea.  I advised there is no guarantee of cure, success, or of final results.  I discussed the alternatives of therapy, expected outcome and potential benefits, and the attendant risks and potential complications, including, but not limited to, excessive intraoperative or postoperative bleeding, hemorrhage, infection, septal perforation (hole in the septum), injury to surrounding structures, excessive scarring, deformity, poor (incomplete or lack of) wound healing, pain, discomfort, numbness of lip, need for further surgery, blindness, loss of vision, meningitis, brain abscess, and risks of anesthesia, including heart attack, cardiac

## 2024-12-31 ENCOUNTER — HOSPITAL ENCOUNTER (OUTPATIENT)
Age: 54
Discharge: HOME | End: 2024-12-31
Payer: COMMERCIAL

## 2024-12-31 ENCOUNTER — HOSPITAL ENCOUNTER (OUTPATIENT)
Dept: GENERAL RADIOLOGY | Age: 54
Discharge: HOME | End: 2024-12-31
Payer: COMMERCIAL

## 2024-12-31 DIAGNOSIS — M50.30 DEGENERATION OF CERVICAL INTERVERTEBRAL DISC: ICD-10-CM

## 2024-12-31 PROCEDURE — 72050 X-RAY EXAM NECK SPINE 4/5VWS: CPT

## 2025-02-23 ENCOUNTER — APPOINTMENT (OUTPATIENT)
Dept: GENERAL RADIOLOGY | Age: 55
End: 2025-02-23
Payer: COMMERCIAL

## 2025-02-23 ENCOUNTER — HOSPITAL ENCOUNTER (EMERGENCY)
Age: 55
Discharge: HOME | End: 2025-02-23
Attending: EMERGENCY MEDICINE
Payer: COMMERCIAL

## 2025-02-23 VITALS
DIASTOLIC BLOOD PRESSURE: 89 MMHG | OXYGEN SATURATION: 98 % | RESPIRATION RATE: 16 BRPM | BODY MASS INDEX: 31.48 KG/M2 | SYSTOLIC BLOOD PRESSURE: 146 MMHG | TEMPERATURE: 98.1 F | WEIGHT: 212.52 LBS | HEIGHT: 69 IN | HEART RATE: 65 BPM

## 2025-02-23 DIAGNOSIS — M25.511 ACUTE PAIN OF RIGHT SHOULDER: Primary | ICD-10-CM

## 2025-02-23 PROCEDURE — 6360000002 HC RX W HCPCS: Performed by: EMERGENCY MEDICINE

## 2025-02-23 PROCEDURE — 96372 THER/PROPH/DIAG INJ SC/IM: CPT

## 2025-02-23 PROCEDURE — 99284 EMERGENCY DEPT VISIT MOD MDM: CPT

## 2025-02-23 PROCEDURE — 73030 X-RAY EXAM OF SHOULDER: CPT

## 2025-02-23 RX ORDER — PREDNISONE 5 MG/1
5 TABLET ORAL
COMMUNITY
Start: 2025-02-03

## 2025-02-23 RX ORDER — KETOROLAC TROMETHAMINE 30 MG/ML
30 INJECTION, SOLUTION INTRAMUSCULAR; INTRAVENOUS ONCE
Status: COMPLETED | OUTPATIENT
Start: 2025-02-23 | End: 2025-02-23

## 2025-02-23 RX ORDER — IBUPROFEN 400 MG/1
400 TABLET, FILM COATED ORAL EVERY 8 HOURS PRN
Qty: 21 TABLET | Refills: 0 | Status: SHIPPED | OUTPATIENT
Start: 2025-02-23 | End: 2025-03-02

## 2025-02-23 RX ADMIN — KETOROLAC TROMETHAMINE 30 MG: 30 INJECTION, SOLUTION INTRAMUSCULAR at 19:31

## 2025-02-23 ASSESSMENT — PAIN SCALES - GENERAL
PAINLEVEL_OUTOF10: 6
PAINLEVEL_OUTOF10: 7
PAINLEVEL_OUTOF10: 6

## 2025-02-23 ASSESSMENT — PAIN DESCRIPTION - FREQUENCY: FREQUENCY: CONTINUOUS

## 2025-02-23 ASSESSMENT — PAIN DESCRIPTION - ORIENTATION
ORIENTATION: RIGHT
ORIENTATION: RIGHT

## 2025-02-23 ASSESSMENT — PAIN DESCRIPTION - LOCATION
LOCATION: SHOULDER
LOCATION: SHOULDER

## 2025-02-23 ASSESSMENT — PAIN - FUNCTIONAL ASSESSMENT
PAIN_FUNCTIONAL_ASSESSMENT: 0-10
PAIN_FUNCTIONAL_ASSESSMENT: 0-10

## 2025-02-23 ASSESSMENT — PAIN DESCRIPTION - DESCRIPTORS
DESCRIPTORS: ACHING
DESCRIPTORS: BURNING;ACHING

## 2025-02-23 ASSESSMENT — PAIN DESCRIPTION - PAIN TYPE: TYPE: ACUTE PAIN

## 2025-02-24 SDOH — HEALTH STABILITY: PHYSICAL HEALTH: ON AVERAGE, HOW MANY MINUTES DO YOU ENGAGE IN EXERCISE AT THIS LEVEL?: 0 MIN

## 2025-02-24 SDOH — HEALTH STABILITY: PHYSICAL HEALTH: ON AVERAGE, HOW MANY DAYS PER WEEK DO YOU ENGAGE IN MODERATE TO STRENUOUS EXERCISE (LIKE A BRISK WALK)?: 0 DAYS

## 2025-02-24 NOTE — ED PROVIDER NOTES
CHIEF COMPLAINT  Chief Complaint   Patient presents with    Hand Injury     Entered by patient    Shoulder Injury     Patient pulled back on a table this am. He felt a pop in his right shoulder       HISTORY OF PRESENT ILLNESS  Eh Price is a 54 y.o. male who presents to the ED complaining of right shoulder pain that started when he was pulling on a table this morning and felt a painful pop in the anterior shoulder with complaints of pain primarily over the anterior shoulder that is worse with external rotation and abduction.  Patient denies pain in the neck or pain in the elbow or hand without radiation of pain.  Patient reports pain is sharp with no swelling or redness in the shoulder.  No chest pain or shortness of breath.    No other complaints, modifying factors or associated symptoms.     Nursing notes reviewed.   Past Medical History:   Diagnosis Date    Asymmetrical sensorineural hearing loss     Environmental allergies     GERD (gastroesophageal reflux disease)     Hypertension     Migraine headache     Mixed anxiety and depressive disorder      Past Surgical History:   Procedure Laterality Date    ANTERIOR CERVICAL DISCECTOMY      NOSE SURGERY      WRIST SURGERY Left      Family History   Problem Relation Age of Onset    Diabetes type 2  Father     Diabetes type 2  Paternal Grandfather     Prostate Cancer Paternal Grandfather      Social History     Socioeconomic History    Marital status: Single     Spouse name: Not on file    Number of children: Not on file    Years of education: Not on file    Highest education level: Not on file   Occupational History    Not on file   Tobacco Use    Smoking status: Former     Current packs/day: 0.50     Average packs/day: 0.5 packs/day for 10.0 years (5.0 ttl pk-yrs)     Types: Cigarettes     Start date: 3/1/2015    Smokeless tobacco: Never   Vaping Use    Vaping status: Never Used   Substance and Sexual Activity    Alcohol use: Not Currently    Drug use: Never

## 2025-02-25 ENCOUNTER — OFFICE VISIT (OUTPATIENT)
Dept: ORTHOPEDIC SURGERY | Age: 55
End: 2025-02-25
Payer: COMMERCIAL

## 2025-02-25 DIAGNOSIS — S40.011A CONTUSION OF RIGHT SHOULDER, INITIAL ENCOUNTER: Primary | ICD-10-CM

## 2025-02-25 PROCEDURE — 99203 OFFICE O/P NEW LOW 30 MIN: CPT | Performed by: ORTHOPAEDIC SURGERY

## 2025-02-25 NOTE — PROGRESS NOTES
CHIEF COMPLAINT: Right shoulder pain/ shoulder RTC contusion.    DATE OF INJURY: 2/23/2025    HISTORY:  Mr. Price is a 54 y.o.  male right handed who presents today for evaluation of a right shoulder injury.  The patient reports that this injury occurred after he pulled back a table and felt a pop right shoulder.  He was first seen and evaluated in [place], when he was x-rayed, and asked to f/u with Orthopedics. The patient denies any other injuries.  Movement makes the pain worse, the sling and resting makes the pain better. No numbness or tingling sensation.  He is getting ACDF surgery in 4/29/2025.    Past Medical History:   Diagnosis Date    Asymmetrical sensorineural hearing loss     Environmental allergies     GERD (gastroesophageal reflux disease)     Hypertension     Migraine headache     Mixed anxiety and depressive disorder        Past Surgical History:   Procedure Laterality Date    ANTERIOR CERVICAL DISCECTOMY      NOSE SURGERY      WRIST SURGERY Left        Social History     Socioeconomic History    Marital status: Single     Spouse name: Not on file    Number of children: Not on file    Years of education: Not on file    Highest education level: Not on file   Occupational History    Not on file   Tobacco Use    Smoking status: Former     Current packs/day: 0.50     Average packs/day: 0.5 packs/day for 10.0 years (5.0 ttl pk-yrs)     Types: Cigarettes     Start date: 3/1/2015    Smokeless tobacco: Never   Vaping Use    Vaping status: Never Used   Substance and Sexual Activity    Alcohol use: Not Currently    Drug use: Never    Sexual activity: Not on file   Other Topics Concern    Not on file   Social History Narrative    Not on file     Social Determinants of Health     Financial Resource Strain: Low Risk  (6/6/2024)    Received from [facility]    Overall Financial Resource Strain (CARDIA)     Difficulty of Paying Living Expenses: Not hard at all   Food Insecurity: No Food Insecurity

## 2025-04-08 ENCOUNTER — TELEPHONE (OUTPATIENT)
Dept: ENT CLINIC | Age: 55
End: 2025-04-08

## 2025-04-08 NOTE — TELEPHONE ENCOUNTER
Left message on patient VM to call Dr Mcgee office to make appointment re: surgery scheduled 4/29/25 as Dr Mcgee is co-surgeon with Dr Benavides.

## 2025-04-21 ENCOUNTER — OFFICE VISIT (OUTPATIENT)
Dept: ENT CLINIC | Age: 55
End: 2025-04-21
Payer: COMMERCIAL

## 2025-04-21 VITALS
TEMPERATURE: 96.9 F | BODY MASS INDEX: 29.92 KG/M2 | DIASTOLIC BLOOD PRESSURE: 77 MMHG | WEIGHT: 202 LBS | SYSTOLIC BLOOD PRESSURE: 125 MMHG | HEIGHT: 69 IN | HEART RATE: 74 BPM

## 2025-04-21 DIAGNOSIS — M50.30 DEGENERATIVE DISC DISEASE, CERVICAL: Primary | ICD-10-CM

## 2025-04-21 PROCEDURE — 3074F SYST BP LT 130 MM HG: CPT | Performed by: OTOLARYNGOLOGY

## 2025-04-21 PROCEDURE — 3078F DIAST BP <80 MM HG: CPT | Performed by: OTOLARYNGOLOGY

## 2025-04-21 PROCEDURE — 99214 OFFICE O/P EST MOD 30 MIN: CPT | Performed by: OTOLARYNGOLOGY

## 2025-04-21 RX ORDER — BACLOFEN 10 MG/1
10 TABLET ORAL 2 TIMES DAILY
COMMUNITY

## 2025-04-21 RX ORDER — LISINOPRIL 10 MG/1
10 TABLET ORAL DAILY
COMMUNITY

## 2025-04-21 RX ORDER — DIAZEPAM 5 MG/1
5 TABLET ORAL EVERY 6 HOURS PRN
COMMUNITY

## 2025-04-21 ASSESSMENT — ENCOUNTER SYMPTOMS
NAUSEA: 0
SINUS PRESSURE: 0
TROUBLE SWALLOWING: 0
DIARRHEA: 0
EYE ITCHING: 0
SORE THROAT: 0
EYE PAIN: 0
COUGH: 0
SINUS PAIN: 0
SHORTNESS OF BREATH: 0
FACIAL SWELLING: 0
CHOKING: 0
RHINORRHEA: 0
VOICE CHANGE: 0
EYE REDNESS: 0

## 2025-04-21 NOTE — H&P (VIEW-ONLY)
Subjective:      Patient ID: Eh Price is a 54 y.o. male.    HPI  Chief Complaint   Patient presents with    degenerative disc disease       History of Present Illness  Head/Neck    Eh is a(n) 54 y.o. male who presents for pre-operative evaluation for cervical degenerative disc disease. Patient states needs revision spine surgery. No plate. Had bone graft. Denies voice or swallowing changes from prior surgery in 200[address] by Dr. Lainez.   Otalgia: No  Odynophagia: No  Dysphagia: No  Voice Changes: Yes, chronic raspy voice. Does not bother.   Lumps in neck: No  Modifying Factors: none  Associates Signs and Symptoms: none    Patient Active Problem List   Diagnosis    Left sided numbness    Myoclonic jerking    Primary hypertension    Neurologic disorder    H/O positive serological reaction for syphilis    H/O syphilis     Past Surgical History:   Procedure Laterality Date    ANTERIOR CERVICAL DISCECTOMY      NOSE SURGERY      WRIST SURGERY Left      Family History   Problem Relation Age of Onset    Diabetes type 2  Father     Diabetes type 2  Paternal Grandfather     Prostate Cancer Paternal Grandfather      Social History     Socioeconomic History    Marital status: Single     Spouse name: Not on file    Number of children: Not on file    Years of education: Not on file    Highest education level: Not on file   Occupational History    Not on file   Tobacco Use    Smoking status: Former     Current packs/day: 0.50     Average packs/day: 0.5 packs/day for 10.1 years (5.1 ttl pk-yrs)     Types: Cigarettes     Start date: 3/1/2015    Smokeless tobacco: Never   Vaping Use    Vaping status: Never Used   Substance and Sexual Activity    Alcohol use: Not Currently    Drug use: Never    Sexual activity: Not on file   Other Topics Concern    Not on file   Social History Narrative    Not on file     Social Drivers of Health     Financial Resource Strain: Low Risk  (6/6/2024)    [facility]    Overall  cervical adenopathy.      Right cervical: No superficial, deep or posterior cervical adenopathy.     Left cervical: No superficial, deep or posterior cervical adenopathy.   Skin:     General: Skin is warm and dry.      Findings: No lesion.   Neurological:      Mental Status: He is alert and oriented to person, place, and time.   Psychiatric:         Mood and Affect: Mood is not anxious or depressed.       Mirror exam was performed.  The nasopharynx, larynx,or hypopharynx were examined.  Vocal fold motion was examined.      Pertinent findings include: normal laryngeal motion.     Assessment:      Diagnosis Orders   1. Degenerative disc disease, cervical               Plan:     OR for revision surgery for degenerative disc disease.   The patient has a diagnosis of    Diagnosis Orders   1. Degenerative disc disease, cervical           which has not been responsive or cannot be treated with maximal medical therapy.  The patient has been advised of options including further medical therapy, surgery, or nothing.  The risks and benefits of surgery were described not limited to infection, bleeding, airway and pharyngeal fistula, scars including hypertrophic and keloids, recurrence of disease  and need for further surgical management of disease.  Specific thyroid risks include permanent or temporary vocal cord injury, hoarseness, and possible need for calcium and vitamin D replacement which may be permanent. Patient expectations during and after surgery including post-operative care and pain control were described. Other health co-morbidities that would increase the risks of bodily harm, complications and risk of death including none were discussed . Questions were answered and the patient agreed to proceed with surgery which will be scheduled in an appropriate time frame in line with the severity of disease.        Kevin Mcgee MD

## 2025-04-21 NOTE — PROGRESS NOTES
Financial Resource Strain (CARDIA)     Difficulty of Paying Living Expenses: Not hard at all   Food Insecurity: No Food Insecurity (11/2/2024)    Hunger Vital Sign     Worried About Running Out of Food in the Last Year: Never true     Ran Out of Food in the Last Year: Never true   Transportation Needs: No Transportation Needs (11/2/2024)    PRAPARE - Transportation     Lack of Transportation (Medical): No     Lack of Transportation (Non-Medical): No   Physical Activity: Inactive (2/24/2025)    Exercise Vital Sign     Days of Exercise per Week: 0 days     Minutes of Exercise per Session: 0 min   Stress: No Stress Concern Present (6/6/2024)    Received from [facility]    [facility] - Occupational Stress Questionnaire     Feeling of Stress : Only a little   Social Connections: Socially Isolated (6/6/2024)    Received from [facility]    Social Connection and Isolation Panel [NHANES]     Frequency of Communication with Friends and Family: More than three times a week     Frequency of Social Gatherings with Friends and Family: Twice a week     Attends Congregational Services: Never     Active Member of Clubs or Organizations: No     Attends Club or Organization Meetings: Patient declined     Marital Status:    Intimate Partner Violence: Not on file   Housing Stability: Low Risk  (11/2/2024)    Housing Stability Vital Sign     Unable to Pay for Housing in the Last Year: No     Number of Times Moved in the Last Year: 0     Homeless in the Last Year: No       DRUG/FOOD ALLERGIES: Bee venom, Other, Paxil [paroxetine hcl], and Nickel    CURRENT MEDICATIONS  Prior to Admission medications    Medication Sig Start Date End Date Taking? Authorizing Provider   predniSONE (DELTASONE) 5 MG tablet Take 1 tablet by mouth 5 times daily 2/3/25   Provider, MD Michelle   ibuprofen (IBU) 400 MG tablet Take 1 tablet by mouth every 8 hours as needed for Pain 2/23/25 3/2/25  Justin Leos,

## 2025-04-23 RX ORDER — TRAMADOL HYDROCHLORIDE 50 MG/1
TABLET ORAL
Status: ON HOLD | COMMUNITY
Start: 2025-04-04 | End: 2025-04-30 | Stop reason: HOSPADM

## 2025-04-23 RX ORDER — GABAPENTIN 800 MG/1
TABLET ORAL 3 TIMES DAILY
COMMUNITY
Start: 2025-03-23

## 2025-04-23 NOTE — PROGRESS NOTES
[facility] PRE-SURGICAL TESTING INSTRUCTIONS                      PRIOR TO PROCEDURE DATE:    1. PLEASE FOLLOW ANY INSTRUCTIONS GIVEN TO YOU PER YOUR SURGEON.      2. Arrange for someone to drive you home and be with you for the first 24 hours after discharge for your safety after your procedure for which you received sedation. Ensure it is someone we can share information with regarding your discharge.     NOTE: At this time ONLY 2 ADULTS may accompany you   One person ENCOURAGED to stay at hospital entire time if outpatient surgery      3. You must contact your surgeon for instructions IF:  You are taking any blood thinners, aspirin, anti-inflammatory or vitamins.  There is a change in your physical condition such as a cold, fever, rash, cuts, sores, or any other infection, especially near your surgical site.    4. Do not drink alcohol the day before or day of your procedure.  Do not use any recreational marijuana at least 24 hours or street drugs (heroin, cocaine) at minimum 5 days prior to your procedure.     5. A Pre-Surgical History and Physical MUST be completed WITHIN 30 DAYS OR LESS prior to your procedure.by your Physician or an Urgent Care        THE DAY OF YOUR PROCEDURE:  1.  Follow instructions for ARRIVAL TIME as DIRECTED BY YOUR SURGEON.     2. Enter the MAIN entrance from [facility] and follow the signs to the free Parking Garage or  Parking (offered free of charge 7 am-5pm).      3. Enter the Main Entrance of the hospital (do not enter from the lower level of the parking garage). Upon entrance, check in with the  at the surgical information desk on your LEFT.   Bring your insurance card and photo ID to register      4. DO NOT EAT ANYTHING 8 hours prior to arrival for surgery.  You may have up to 8 ounces of water 4 hours prior to your arrival for surgery.   NOTE: ALL Gastric, Bariatric & Bowel surgery patients - you MUST follow your surgeon's instructions regarding

## 2025-04-23 NOTE — PROGRESS NOTES
Spoke with PCP office requested copy of H&P and EKG tracing to be faxed over for upcoming surgery. Confirmed understanding and will have message sent back for paperwork to be faxed over.-AS

## 2025-04-28 ENCOUNTER — ANESTHESIA EVENT (OUTPATIENT)
Dept: OPERATING ROOM | Age: 55
DRG: 472 | End: 2025-04-28
Payer: COMMERCIAL

## 2025-04-29 ENCOUNTER — HOSPITAL ENCOUNTER (INPATIENT)
Age: 55
LOS: 1 days | Discharge: HOME | DRG: 472 | End: 2025-04-30
Attending: STUDENT IN AN ORGANIZED HEALTH CARE EDUCATION/TRAINING PROGRAM | Admitting: STUDENT IN AN ORGANIZED HEALTH CARE EDUCATION/TRAINING PROGRAM
Payer: COMMERCIAL

## 2025-04-29 ENCOUNTER — APPOINTMENT (OUTPATIENT)
Dept: GENERAL RADIOLOGY | Age: 55
DRG: 472 | End: 2025-04-29
Attending: STUDENT IN AN ORGANIZED HEALTH CARE EDUCATION/TRAINING PROGRAM
Payer: COMMERCIAL

## 2025-04-29 ENCOUNTER — ANESTHESIA (OUTPATIENT)
Dept: OPERATING ROOM | Age: 55
DRG: 472 | End: 2025-04-29
Payer: COMMERCIAL

## 2025-04-29 DIAGNOSIS — J30.2 SEASONAL ALLERGIES: Chronic | ICD-10-CM

## 2025-04-29 DIAGNOSIS — J34.2 NASAL SEPTAL DEVIATION: Chronic | ICD-10-CM

## 2025-04-29 DIAGNOSIS — R09.81 NASAL CONGESTION: Chronic | ICD-10-CM

## 2025-04-29 DIAGNOSIS — Z98.1 S/P CERVICAL SPINAL FUSION: Primary | ICD-10-CM

## 2025-04-29 PROBLEM — M50.30 DEGENERATIVE CERVICAL DISC: Status: ACTIVE | Noted: 2025-04-29

## 2025-04-29 PROBLEM — M54.12 CERVICAL RADICULOPATHY: Status: ACTIVE | Noted: 2025-04-29

## 2025-04-29 LAB
ABO/RH: NORMAL
ANTIBODY SCREEN: NORMAL
INR PPP: 1.11 (ref 0.85–1.15)
PROTHROMBIN TIME: 14.5 SEC (ref 11.9–14.9)

## 2025-04-29 PROCEDURE — 7100000000 HC PACU RECOVERY - FIRST 15 MIN: Performed by: STUDENT IN AN ORGANIZED HEALTH CARE EDUCATION/TRAINING PROGRAM

## 2025-04-29 PROCEDURE — 2500000003 HC RX 250 WO HCPCS: Performed by: STUDENT IN AN ORGANIZED HEALTH CARE EDUCATION/TRAINING PROGRAM

## 2025-04-29 PROCEDURE — C1762 CONN TISS, HUMAN(INC FASCIA): HCPCS | Performed by: STUDENT IN AN ORGANIZED HEALTH CARE EDUCATION/TRAINING PROGRAM

## 2025-04-29 PROCEDURE — 2720000010 HC SURG SUPPLY STERILE: Performed by: STUDENT IN AN ORGANIZED HEALTH CARE EDUCATION/TRAINING PROGRAM

## 2025-04-29 PROCEDURE — 2580000003 HC RX 258: Performed by: ANESTHESIOLOGY

## 2025-04-29 PROCEDURE — 86901 BLOOD TYPING SEROLOGIC RH(D): CPT

## 2025-04-29 PROCEDURE — 6360000002 HC RX W HCPCS

## 2025-04-29 PROCEDURE — 6370000000 HC RX 637 (ALT 250 FOR IP)

## 2025-04-29 PROCEDURE — C1889 IMPLANT/INSERT DEVICE, NOC: HCPCS | Performed by: STUDENT IN AN ORGANIZED HEALTH CARE EDUCATION/TRAINING PROGRAM

## 2025-04-29 PROCEDURE — G0378 HOSPITAL OBSERVATION PER HR: HCPCS

## 2025-04-29 PROCEDURE — 3600000014 HC SURGERY LEVEL 4 ADDTL 15MIN: Performed by: STUDENT IN AN ORGANIZED HEALTH CARE EDUCATION/TRAINING PROGRAM

## 2025-04-29 PROCEDURE — 2709999900 HC NON-CHARGEABLE SUPPLY: Performed by: STUDENT IN AN ORGANIZED HEALTH CARE EDUCATION/TRAINING PROGRAM

## 2025-04-29 PROCEDURE — 2060000000 HC ICU INTERMEDIATE R&B

## 2025-04-29 PROCEDURE — 6360000002 HC RX W HCPCS: Performed by: STUDENT IN AN ORGANIZED HEALTH CARE EDUCATION/TRAINING PROGRAM

## 2025-04-29 PROCEDURE — 7100000001 HC PACU RECOVERY - ADDTL 15 MIN: Performed by: STUDENT IN AN ORGANIZED HEALTH CARE EDUCATION/TRAINING PROGRAM

## 2025-04-29 PROCEDURE — 86850 RBC ANTIBODY SCREEN: CPT

## 2025-04-29 PROCEDURE — 85610 PROTHROMBIN TIME: CPT

## 2025-04-29 PROCEDURE — 3700000001 HC ADD 15 MINUTES (ANESTHESIA): Performed by: STUDENT IN AN ORGANIZED HEALTH CARE EDUCATION/TRAINING PROGRAM

## 2025-04-29 PROCEDURE — 01N50ZZ RELEASE MEDIAN NERVE, OPEN APPROACH: ICD-10-PCS | Performed by: STUDENT IN AN ORGANIZED HEALTH CARE EDUCATION/TRAINING PROGRAM

## 2025-04-29 PROCEDURE — 94640 AIRWAY INHALATION TREATMENT: CPT

## 2025-04-29 PROCEDURE — 0RB30ZZ EXCISION OF CERVICAL VERTEBRAL DISC, OPEN APPROACH: ICD-10-PCS | Performed by: STUDENT IN AN ORGANIZED HEALTH CARE EDUCATION/TRAINING PROGRAM

## 2025-04-29 PROCEDURE — C1713 ANCHOR/SCREW BN/BN,TIS/BN: HCPCS | Performed by: STUDENT IN AN ORGANIZED HEALTH CARE EDUCATION/TRAINING PROGRAM

## 2025-04-29 PROCEDURE — 72040 X-RAY EXAM NECK SPINE 2-3 VW: CPT

## 2025-04-29 PROCEDURE — 2500000003 HC RX 250 WO HCPCS: Performed by: ANESTHESIOLOGY

## 2025-04-29 PROCEDURE — 2580000003 HC RX 258

## 2025-04-29 PROCEDURE — 6370000000 HC RX 637 (ALT 250 FOR IP): Performed by: STUDENT IN AN ORGANIZED HEALTH CARE EDUCATION/TRAINING PROGRAM

## 2025-04-29 PROCEDURE — 2500000003 HC RX 250 WO HCPCS

## 2025-04-29 PROCEDURE — 01N10ZZ RELEASE CERVICAL NERVE, OPEN APPROACH: ICD-10-PCS | Performed by: STUDENT IN AN ORGANIZED HEALTH CARE EDUCATION/TRAINING PROGRAM

## 2025-04-29 PROCEDURE — 86900 BLOOD TYPING SEROLOGIC ABO: CPT

## 2025-04-29 PROCEDURE — 3700000000 HC ANESTHESIA ATTENDED CARE: Performed by: STUDENT IN AN ORGANIZED HEALTH CARE EDUCATION/TRAINING PROGRAM

## 2025-04-29 PROCEDURE — 0RG10A0 FUSION OF CERVICAL VERTEBRAL JOINT WITH INTERBODY FUSION DEVICE, ANTERIOR APPROACH, ANTERIOR COLUMN, OPEN APPROACH: ICD-10-PCS | Performed by: STUDENT IN AN ORGANIZED HEALTH CARE EDUCATION/TRAINING PROGRAM

## 2025-04-29 PROCEDURE — 3600000004 HC SURGERY LEVEL 4 BASE: Performed by: STUDENT IN AN ORGANIZED HEALTH CARE EDUCATION/TRAINING PROGRAM

## 2025-04-29 DEVICE — ALLOGRAFT BNE 1 CC FIBER PLIAFX PRIM: Type: IMPLANTABLE DEVICE | Site: ANTERIOR CERVICAL | Status: FUNCTIONAL

## 2025-04-29 DEVICE — SPACER SPNL LORDTC 360 DEG STD 14X13X6 MM STRL ACIS PROTI: Type: IMPLANTABLE DEVICE | Site: ANTERIOR CERVICAL | Status: FUNCTIONAL

## 2025-04-29 DEVICE — PLATE SPNL L 12 MM TI ANTR CERV 1 LEVEL NONSTERILE CODA: Type: IMPLANTABLE DEVICE | Site: ANTERIOR CERVICAL | Status: FUNCTIONAL

## 2025-04-29 DEVICE — SCREW SPNL L 14 MM DIA 3.5 MM TI ANTR CERV SELF DRILLING VA: Type: IMPLANTABLE DEVICE | Site: ANTERIOR CERVICAL | Status: FUNCTIONAL

## 2025-04-29 RX ORDER — SUMATRIPTAN 50 MG/1
50 TABLET, FILM COATED ORAL DAILY PRN
Status: DISCONTINUED | OUTPATIENT
Start: 2025-04-29 | End: 2025-04-30 | Stop reason: HOSPADM

## 2025-04-29 RX ORDER — SODIUM CHLORIDE, SODIUM LACTATE, POTASSIUM CHLORIDE, CALCIUM CHLORIDE 600; 310; 30; 20 MG/100ML; MG/100ML; MG/100ML; MG/100ML
INJECTION, SOLUTION INTRAVENOUS
Status: DISCONTINUED | OUTPATIENT
Start: 2025-04-29 | End: 2025-04-29 | Stop reason: SDUPTHER

## 2025-04-29 RX ORDER — ONDANSETRON 2 MG/ML
INJECTION INTRAMUSCULAR; INTRAVENOUS
Status: DISCONTINUED | OUTPATIENT
Start: 2025-04-29 | End: 2025-04-29 | Stop reason: SDUPTHER

## 2025-04-29 RX ORDER — LORAZEPAM 2 MG/ML
0.5 INJECTION INTRAMUSCULAR
Status: DISCONTINUED | OUTPATIENT
Start: 2025-04-29 | End: 2025-04-29 | Stop reason: HOSPADM

## 2025-04-29 RX ORDER — METHOCARBAMOL 100 MG/ML
INJECTION, SOLUTION INTRAMUSCULAR; INTRAVENOUS
Status: DISCONTINUED | OUTPATIENT
Start: 2025-04-29 | End: 2025-04-29 | Stop reason: SDUPTHER

## 2025-04-29 RX ORDER — GABAPENTIN 400 MG/1
800 CAPSULE ORAL 3 TIMES DAILY
Status: DISCONTINUED | OUTPATIENT
Start: 2025-04-29 | End: 2025-04-30 | Stop reason: HOSPADM

## 2025-04-29 RX ORDER — NYSTATIN 100000 [USP'U]/ML
500000 SUSPENSION ORAL 4 TIMES DAILY
COMMUNITY
Start: 2025-04-25 | End: 2025-05-09

## 2025-04-29 RX ORDER — SODIUM CHLORIDE, SODIUM LACTATE, POTASSIUM CHLORIDE, CALCIUM CHLORIDE 600; 310; 30; 20 MG/100ML; MG/100ML; MG/100ML; MG/100ML
INJECTION, SOLUTION INTRAVENOUS CONTINUOUS
Status: DISCONTINUED | OUTPATIENT
Start: 2025-04-29 | End: 2025-04-29 | Stop reason: HOSPADM

## 2025-04-29 RX ORDER — DIPHENHYDRAMINE HYDROCHLORIDE 50 MG/ML
12.5 INJECTION, SOLUTION INTRAMUSCULAR; INTRAVENOUS
Status: DISCONTINUED | OUTPATIENT
Start: 2025-04-29 | End: 2025-04-29 | Stop reason: HOSPADM

## 2025-04-29 RX ORDER — ONDANSETRON 2 MG/ML
4 INJECTION INTRAMUSCULAR; INTRAVENOUS
Status: DISCONTINUED | OUTPATIENT
Start: 2025-04-29 | End: 2025-04-29 | Stop reason: HOSPADM

## 2025-04-29 RX ORDER — SODIUM CHLORIDE 9 MG/ML
INJECTION, SOLUTION INTRAVENOUS PRN
Status: DISCONTINUED | OUTPATIENT
Start: 2025-04-29 | End: 2025-04-29 | Stop reason: HOSPADM

## 2025-04-29 RX ORDER — PROCHLORPERAZINE EDISYLATE 5 MG/ML
5 INJECTION INTRAMUSCULAR; INTRAVENOUS
Status: DISCONTINUED | OUTPATIENT
Start: 2025-04-29 | End: 2025-04-29 | Stop reason: HOSPADM

## 2025-04-29 RX ORDER — OXYCODONE HYDROCHLORIDE 5 MG/1
10 TABLET ORAL EVERY 4 HOURS PRN
Status: DISCONTINUED | OUTPATIENT
Start: 2025-04-29 | End: 2025-04-30 | Stop reason: HOSPADM

## 2025-04-29 RX ORDER — BACLOFEN 10 MG/1
10 TABLET ORAL 2 TIMES DAILY
Status: DISCONTINUED | OUTPATIENT
Start: 2025-04-29 | End: 2025-04-30 | Stop reason: HOSPADM

## 2025-04-29 RX ORDER — SODIUM CHLORIDE 0.9 % (FLUSH) 0.9 %
5-40 SYRINGE (ML) INJECTION EVERY 12 HOURS SCHEDULED
Status: DISCONTINUED | OUTPATIENT
Start: 2025-04-29 | End: 2025-04-29 | Stop reason: HOSPADM

## 2025-04-29 RX ORDER — LABETALOL HYDROCHLORIDE 5 MG/ML
10 INJECTION, SOLUTION INTRAVENOUS
Status: DISCONTINUED | OUTPATIENT
Start: 2025-04-29 | End: 2025-04-29 | Stop reason: HOSPADM

## 2025-04-29 RX ORDER — FENTANYL CITRATE 50 UG/ML
25 INJECTION, SOLUTION INTRAMUSCULAR; INTRAVENOUS EVERY 5 MIN PRN
Status: DISCONTINUED | OUTPATIENT
Start: 2025-04-29 | End: 2025-04-29 | Stop reason: HOSPADM

## 2025-04-29 RX ORDER — NALOXONE HYDROCHLORIDE 0.4 MG/ML
INJECTION, SOLUTION INTRAMUSCULAR; INTRAVENOUS; SUBCUTANEOUS PRN
Status: DISCONTINUED | OUTPATIENT
Start: 2025-04-29 | End: 2025-04-29 | Stop reason: HOSPADM

## 2025-04-29 RX ORDER — PREDNISONE 5 MG/1
2.5 TABLET ORAL 2 TIMES DAILY
Status: DISCONTINUED | OUTPATIENT
Start: 2025-04-29 | End: 2025-04-30 | Stop reason: HOSPADM

## 2025-04-29 RX ORDER — NYSTATIN 100000 [USP'U]/ML
500000 SUSPENSION ORAL 4 TIMES DAILY
Status: DISCONTINUED | OUTPATIENT
Start: 2025-04-29 | End: 2025-04-30 | Stop reason: HOSPADM

## 2025-04-29 RX ORDER — OXYCODONE HYDROCHLORIDE 5 MG/1
5 TABLET ORAL
Status: DISCONTINUED | OUTPATIENT
Start: 2025-04-29 | End: 2025-04-29 | Stop reason: HOSPADM

## 2025-04-29 RX ORDER — ENOXAPARIN SODIUM 100 MG/ML
40 INJECTION SUBCUTANEOUS DAILY
Status: DISCONTINUED | OUTPATIENT
Start: 2025-04-30 | End: 2025-04-30 | Stop reason: HOSPADM

## 2025-04-29 RX ORDER — BACITRACIN ZINC AND POLYMYXIN B SULFATE 500; 1000 [USP'U]/G; [USP'U]/G
OINTMENT TOPICAL PRN
Status: DISCONTINUED | OUTPATIENT
Start: 2025-04-29 | End: 2025-04-29 | Stop reason: ALTCHOICE

## 2025-04-29 RX ORDER — IPRATROPIUM BROMIDE AND ALBUTEROL SULFATE 2.5; .5 MG/3ML; MG/3ML
1 SOLUTION RESPIRATORY (INHALATION)
Status: DISCONTINUED | OUTPATIENT
Start: 2025-04-29 | End: 2025-04-29 | Stop reason: HOSPADM

## 2025-04-29 RX ORDER — LISINOPRIL 10 MG/1
10 TABLET ORAL DAILY
Status: DISCONTINUED | OUTPATIENT
Start: 2025-04-30 | End: 2025-04-30 | Stop reason: HOSPADM

## 2025-04-29 RX ORDER — OXYCODONE HYDROCHLORIDE 5 MG/1
5 TABLET ORAL EVERY 4 HOURS PRN
Status: DISCONTINUED | OUTPATIENT
Start: 2025-04-29 | End: 2025-04-30 | Stop reason: HOSPADM

## 2025-04-29 RX ORDER — SUCCINYLCHOLINE/SOD CL,ISO/PF 200MG/10ML
SYRINGE (ML) INTRAVENOUS
Status: DISCONTINUED | OUTPATIENT
Start: 2025-04-29 | End: 2025-04-29 | Stop reason: SDUPTHER

## 2025-04-29 RX ORDER — FLUTICASONE PROPIONATE 50 MCG
1 SPRAY, SUSPENSION (ML) NASAL DAILY PRN
Status: DISCONTINUED | OUTPATIENT
Start: 2025-04-29 | End: 2025-04-30 | Stop reason: HOSPADM

## 2025-04-29 RX ORDER — PANTOPRAZOLE SODIUM 40 MG/1
40 TABLET, DELAYED RELEASE ORAL
Status: DISCONTINUED | OUTPATIENT
Start: 2025-04-30 | End: 2025-04-30 | Stop reason: HOSPADM

## 2025-04-29 RX ORDER — PROPOFOL 10 MG/ML
INJECTION, EMULSION INTRAVENOUS
Status: DISCONTINUED | OUTPATIENT
Start: 2025-04-29 | End: 2025-04-29 | Stop reason: SDUPTHER

## 2025-04-29 RX ORDER — SODIUM CHLORIDE 0.9 % (FLUSH) 0.9 %
5-40 SYRINGE (ML) INJECTION PRN
Status: DISCONTINUED | OUTPATIENT
Start: 2025-04-29 | End: 2025-04-29 | Stop reason: HOSPADM

## 2025-04-29 RX ORDER — DEXAMETHASONE SODIUM PHOSPHATE 4 MG/ML
INJECTION, SOLUTION INTRA-ARTICULAR; INTRALESIONAL; INTRAMUSCULAR; INTRAVENOUS; SOFT TISSUE
Status: DISCONTINUED | OUTPATIENT
Start: 2025-04-29 | End: 2025-04-29 | Stop reason: SDUPTHER

## 2025-04-29 RX ORDER — REMIFENTANIL HYDROCHLORIDE 1 MG/ML
INJECTION, POWDER, LYOPHILIZED, FOR SOLUTION INTRAVENOUS
Status: DISCONTINUED | OUTPATIENT
Start: 2025-04-29 | End: 2025-04-29 | Stop reason: SDUPTHER

## 2025-04-29 RX ORDER — MEPERIDINE HYDROCHLORIDE 25 MG/ML
12.5 INJECTION INTRAMUSCULAR; INTRAVENOUS; SUBCUTANEOUS EVERY 5 MIN PRN
Status: DISCONTINUED | OUTPATIENT
Start: 2025-04-29 | End: 2025-04-29 | Stop reason: HOSPADM

## 2025-04-29 RX ORDER — ALBUTEROL SULFATE 0.83 MG/ML
2.5 SOLUTION RESPIRATORY (INHALATION) EVERY 4 HOURS PRN
Status: DISCONTINUED | OUTPATIENT
Start: 2025-04-29 | End: 2025-04-30 | Stop reason: HOSPADM

## 2025-04-29 RX ORDER — LIDOCAINE HYDROCHLORIDE 20 MG/ML
INJECTION, SOLUTION INTRAVENOUS
Status: DISCONTINUED | OUTPATIENT
Start: 2025-04-29 | End: 2025-04-29 | Stop reason: SDUPTHER

## 2025-04-29 RX ORDER — DIAZEPAM 5 MG/1
5 TABLET ORAL EVERY 6 HOURS PRN
Status: DISCONTINUED | OUTPATIENT
Start: 2025-04-29 | End: 2025-04-30 | Stop reason: HOSPADM

## 2025-04-29 RX ORDER — ROCURONIUM BROMIDE 10 MG/ML
INJECTION, SOLUTION INTRAVENOUS
Status: DISCONTINUED | OUTPATIENT
Start: 2025-04-29 | End: 2025-04-29 | Stop reason: SDUPTHER

## 2025-04-29 RX ORDER — HYDROMORPHONE HYDROCHLORIDE 1 MG/ML
0.5 INJECTION, SOLUTION INTRAMUSCULAR; INTRAVENOUS; SUBCUTANEOUS EVERY 5 MIN PRN
Status: COMPLETED | OUTPATIENT
Start: 2025-04-29 | End: 2025-04-29

## 2025-04-29 RX ORDER — MIDAZOLAM HYDROCHLORIDE 1 MG/ML
INJECTION, SOLUTION INTRAMUSCULAR; INTRAVENOUS
Status: DISCONTINUED | OUTPATIENT
Start: 2025-04-29 | End: 2025-04-29 | Stop reason: SDUPTHER

## 2025-04-29 RX ORDER — LIDOCAINE HYDROCHLORIDE AND EPINEPHRINE 10; 10 MG/ML; UG/ML
INJECTION, SOLUTION INFILTRATION; PERINEURAL PRN
Status: DISCONTINUED | OUTPATIENT
Start: 2025-04-29 | End: 2025-04-29 | Stop reason: ALTCHOICE

## 2025-04-29 RX ADMIN — HYDROMORPHONE HYDROCHLORIDE 0.5 MG: 1 INJECTION, SOLUTION INTRAMUSCULAR; INTRAVENOUS; SUBCUTANEOUS at 10:16

## 2025-04-29 RX ADMIN — SUGAMMADEX 150 MG: 100 INJECTION, SOLUTION INTRAVENOUS at 09:59

## 2025-04-29 RX ADMIN — METHOCARBAMOL 1000 MG: 100 INJECTION INTRAMUSCULAR; INTRAVENOUS at 09:08

## 2025-04-29 RX ADMIN — WATER 2000 MG: 1 INJECTION INTRAMUSCULAR; INTRAVENOUS; SUBCUTANEOUS at 07:51

## 2025-04-29 RX ADMIN — HYDROMORPHONE HYDROCHLORIDE 0.5 MG: 1 INJECTION, SOLUTION INTRAMUSCULAR; INTRAVENOUS; SUBCUTANEOUS at 07:54

## 2025-04-29 RX ADMIN — SODIUM CHLORIDE, SODIUM LACTATE, POTASSIUM CHLORIDE, AND CALCIUM CHLORIDE: .6; .31; .03; .02 INJECTION, SOLUTION INTRAVENOUS at 07:35

## 2025-04-29 RX ADMIN — LIDOCAINE HYDROCHLORIDE 80 MG: 20 INJECTION, SOLUTION INTRAVENOUS at 07:42

## 2025-04-29 RX ADMIN — HYDROMORPHONE HYDROCHLORIDE 0.5 MG: 1 INJECTION, SOLUTION INTRAMUSCULAR; INTRAVENOUS; SUBCUTANEOUS at 09:37

## 2025-04-29 RX ADMIN — HYDROMORPHONE HYDROCHLORIDE 0.5 MG: 1 INJECTION, SOLUTION INTRAMUSCULAR; INTRAVENOUS; SUBCUTANEOUS at 07:40

## 2025-04-29 RX ADMIN — GABAPENTIN 800 MG: 400 CAPSULE ORAL at 21:28

## 2025-04-29 RX ADMIN — OXYCODONE 10 MG: 5 TABLET ORAL at 22:15

## 2025-04-29 RX ADMIN — PROPOFOL 150 MG: 10 INJECTION, EMULSION INTRAVENOUS at 07:42

## 2025-04-29 RX ADMIN — DEXAMETHASONE SODIUM PHOSPHATE 8 MG: 4 INJECTION INTRA-ARTICULAR; INTRALESIONAL; INTRAMUSCULAR; INTRAVENOUS; SOFT TISSUE at 08:03

## 2025-04-29 RX ADMIN — REMIFENTANIL HYDROCHLORIDE 0.15 MCG/KG/MIN: 1 INJECTION, POWDER, LYOPHILIZED, FOR SOLUTION INTRAVENOUS at 07:45

## 2025-04-29 RX ADMIN — DIAZEPAM 5 MG: 5 TABLET ORAL at 20:01

## 2025-04-29 RX ADMIN — HYDROMORPHONE HYDROCHLORIDE 0.5 MG: 1 INJECTION, SOLUTION INTRAMUSCULAR; INTRAVENOUS; SUBCUTANEOUS at 11:56

## 2025-04-29 RX ADMIN — PROPOFOL 125 MCG/KG/MIN: 10 INJECTION, EMULSION INTRAVENOUS at 07:45

## 2025-04-29 RX ADMIN — MIDAZOLAM HYDROCHLORIDE 2 MG: 1 INJECTION, SOLUTION INTRAMUSCULAR; INTRAVENOUS at 07:33

## 2025-04-29 RX ADMIN — SODIUM CHLORIDE, SODIUM LACTATE, POTASSIUM CHLORIDE, AND CALCIUM CHLORIDE: .6; .31; .03; .02 INJECTION, SOLUTION INTRAVENOUS at 06:43

## 2025-04-29 RX ADMIN — HYDROMORPHONE HYDROCHLORIDE 0.5 MG: 1 INJECTION, SOLUTION INTRAMUSCULAR; INTRAVENOUS; SUBCUTANEOUS at 10:30

## 2025-04-29 RX ADMIN — ARFORMOTEROL TARTRATE: 15 SOLUTION RESPIRATORY (INHALATION) at 21:03

## 2025-04-29 RX ADMIN — PREDNISONE 2.5 MG: 5 TABLET ORAL at 20:01

## 2025-04-29 RX ADMIN — WATER 2000 MG: 1 INJECTION INTRAMUSCULAR; INTRAVENOUS; SUBCUTANEOUS at 17:57

## 2025-04-29 RX ADMIN — Medication 140 MG: at 07:42

## 2025-04-29 RX ADMIN — ROCURONIUM BROMIDE 30 MG: 10 INJECTION, SOLUTION INTRAVENOUS at 08:01

## 2025-04-29 RX ADMIN — ONDANSETRON 4 MG: 2 INJECTION INTRAMUSCULAR; INTRAVENOUS at 09:10

## 2025-04-29 RX ADMIN — HYDROMORPHONE HYDROCHLORIDE 0.5 MG: 1 INJECTION, SOLUTION INTRAMUSCULAR; INTRAVENOUS; SUBCUTANEOUS at 09:20

## 2025-04-29 RX ADMIN — BACLOFEN 10 MG: 10 TABLET ORAL at 20:01

## 2025-04-29 RX ADMIN — HYDROMORPHONE HYDROCHLORIDE 0.5 MG: 1 INJECTION, SOLUTION INTRAMUSCULAR; INTRAVENOUS; SUBCUTANEOUS at 14:23

## 2025-04-29 RX ADMIN — OXYCODONE 10 MG: 5 TABLET ORAL at 18:03

## 2025-04-29 RX ADMIN — SODIUM CHLORIDE, SODIUM LACTATE, POTASSIUM CHLORIDE, AND CALCIUM CHLORIDE: .6; .31; .03; .02 INJECTION, SOLUTION INTRAVENOUS at 09:29

## 2025-04-29 RX ADMIN — PROPOFOL 40 MG: 10 INJECTION, EMULSION INTRAVENOUS at 07:44

## 2025-04-29 RX ADMIN — SUGAMMADEX 50 MG: 100 INJECTION, SOLUTION INTRAVENOUS at 08:26

## 2025-04-29 RX ADMIN — NYSTATIN 500000 UNITS: 100000 SUSPENSION ORAL at 20:02

## 2025-04-29 ASSESSMENT — PAIN SCALES - GENERAL
PAINLEVEL_OUTOF10: 7
PAINLEVEL_OUTOF10: 4
PAINLEVEL_OUTOF10: 7
PAINLEVEL_OUTOF10: 7
PAINLEVEL_OUTOF10: 3
PAINLEVEL_OUTOF10: 7
PAINLEVEL_OUTOF10: 8
PAINLEVEL_OUTOF10: 2
PAINLEVEL_OUTOF10: 7
PAINLEVEL_OUTOF10: 3
PAINLEVEL_OUTOF10: 0
PAINLEVEL_OUTOF10: 4

## 2025-04-29 ASSESSMENT — PAIN DESCRIPTION - DESCRIPTORS
DESCRIPTORS: ACHING
DESCRIPTORS: ACHING;STABBING
DESCRIPTORS: ACHING
DESCRIPTORS: ACHING

## 2025-04-29 ASSESSMENT — PAIN DESCRIPTION - ORIENTATION
ORIENTATION: ANTERIOR
ORIENTATION: ANTERIOR;POSTERIOR

## 2025-04-29 ASSESSMENT — PAIN - FUNCTIONAL ASSESSMENT
PAIN_FUNCTIONAL_ASSESSMENT: 0-10
PAIN_FUNCTIONAL_ASSESSMENT: PREVENTS OR INTERFERES SOME ACTIVE ACTIVITIES AND ADLS
PAIN_FUNCTIONAL_ASSESSMENT: PREVENTS OR INTERFERES SOME ACTIVE ACTIVITIES AND ADLS
PAIN_FUNCTIONAL_ASSESSMENT: 0-10

## 2025-04-29 ASSESSMENT — PAIN DESCRIPTION - LOCATION
LOCATION: NECK

## 2025-04-29 ASSESSMENT — PAIN DESCRIPTION - PAIN TYPE
TYPE: SURGICAL PAIN

## 2025-04-29 ASSESSMENT — PAIN DESCRIPTION - ONSET
ONSET: ON-GOING

## 2025-04-29 ASSESSMENT — PAIN DESCRIPTION - FREQUENCY
FREQUENCY: CONTINUOUS

## 2025-04-29 NOTE — PROGRESS NOTES
Patient arrived to PACU post CERVICAL 4 - CERVICAL 5 ANTERIOR CERVICAL DISCECTOMY AND FUSION ; AND RIGHT CARPAL TUNNEL RELEASE with Dr. Benavides. VSS on arrival. CRNA gave PACU RN report at bedside stating no complications during procedure. Dressing to surgical sites clean, dry and intact. Patient shows no signs of pain at this time. Will continue to monitor.

## 2025-04-29 NOTE — ANESTHESIA POSTPROCEDURE EVALUATION
Department of Anesthesiology  Postprocedure Note    Patient: Eh Price  MRN: [id number]  YOB: 1970  Date of evaluation: 4/29/2025    Procedure Summary       Date: 04/29/25 Room / Location: [address]    Anesthesia Start: 0735 Anesthesia Stop: 1010    Procedures:       CERVICAL 4 - CERVICAL 5 ANTERIOR CERVICAL DISCECTOMY AND FUSION ; AND RIGHT CARPAL TUNNEL RELEASE (Spine Cervical)      . (Right: Hand) Diagnosis:       Degenerative cervical disc      (Degenerative cervical disc [M50.30])    Surgeons: Yamini Benavides MD Responsible Provider: Hermann Ortega MD    Anesthesia Type: general ASA Status: 3            Anesthesia Type: No value filed.    Ander Phase I: Ander Score: 8    Ander Phase II:      Anesthesia Post Evaluation    Patient location during evaluation: PACU  Patient participation: complete - patient participated  Level of consciousness: awake  Airway patency: patent  Nausea & Vomiting: no nausea and no vomiting  Cardiovascular status: blood pressure returned to baseline and hemodynamically stable  Respiratory status: acceptable  Hydration status: euvolemic  Multimodal analgesia pain management approach  Pain management: adequate    No notable events documented.

## 2025-04-29 NOTE — ANESTHESIA PRE PROCEDURE
Department of Anesthesiology  Preprocedure Note       Name:  Eh Price   Age:  54 y.o.  :  1970                                          MRN:  [id number]         Date:  2025      Surgeon: Surgeon(s):  Yamini Benavides MD Zimmer, Lee Alexander, MD    Procedure: Procedure(s):  CERVICAL 4 - CERVICAL 5 ANTERIOR CERVICAL DISCECTOMY AND FUSION ; AND RIGHT CARPAL TUNNEL RELEASE  .    Medications prior to admission:   Prior to Admission medications    Medication Sig Start Date End Date Taking? Authorizing Provider   nystatin (MYCOSTATIN) 098335 UNIT/ML suspension Take 5 mLs by mouth 4 times daily 25 Yes Michelle Bonilla MD   gabapentin (NEURONTIN) 800 MG tablet 3 times daily. 3/23/25  Yes Michelle Bonilla MD   baclofen (LIORESAL) 10 MG tablet Take 1 tablet by mouth 2 times daily   Yes ProviderMichelle MD   lisinopril (PRINIVIL;ZESTRIL) 10 MG tablet Take 1 tablet by mouth daily   Yes ProviderMichelle MD   diazePAM (VALIUM) 5 MG tablet Take 1 tablet by mouth every 6 hours as needed for Anxiety (Takes at night).   Yes Provider, MD Michelle   predniSONE (DELTASONE) 5 MG tablet Take 0.5 tablets by mouth daily 2/3/25  Yes ProviderMichelle MD   fluticasone-salmeterol (ADVAIR) 100-50 MCG/ACT AEPB diskus inhaler  24  Yes ProviderMichelle MD   fluticasone (FLONASE) 50 MCG/ACT nasal spray 2 sprays in each nostril daily. 10/3/24  Yes Vikram Austin MD   albuterol sulfate HFA (PROVENTIL;VENTOLIN;PROAIR) 108 (90 Base) MCG/ACT inhaler Inhale 2 puffs into the lungs every 4 hours as needed 24 Yes ProviderMichelle MD   SUMAtriptan (IMITREX) 50 MG tablet TAKE 1 TABLET (50 MG TOTAL) BY MOUTH IF NEEDED FOR MIGRAINE.   Yes ProviderMichelle MD   omeprazole (PRILOSEC) 40 MG delayed release capsule Take 1 capsule by mouth daily 7/22/15  Yes Michelle Bonilla MD   traMADol (ULTRAM) 50 MG tablet TAKE 1 TABLET EVERY 6 (SIX) HOURS IF NEEDED FOR SEVERE

## 2025-04-29 NOTE — PROGRESS NOTES
4 Eyes Skin Assessment     NAME:  Eh Price  YOB: 1970  MEDICAL RECORD NUMBER:  [id number]    The patient is being assessed for  Admission    I agree that at least one RN has performed a thorough Head to Toe Skin Assessment on the patient. ALL assessment sites listed below have been assessed.      Areas assessed by both nurses:    Head, Face, Ears, Shoulders, Back, Chest, Arms, Elbows, Hands, Sacrum. Buttock, Coccyx, Ischium, Legs. Feet and Heels, and Under Medical Devices         Does the Patient have a Wound? No noted wound(s)     Anterior neck surgical incision/hemovac    Miki Prevention initiated by RN: Yes  Wound Care Orders initiated by RN: No    Pressure Injury (Stage 3,4, Unstageable, DTI, NWPT, and Complex wounds) if present, place Wound referral order by RN under : No    New Ostomies, if present place, Ostomy referral order under : No     Nurse 1 eSignature: Electronically signed by Wellington Sosa RN on 4/29/25 at 5:28 PM EDT    **SHARE this note so that the co-signing nurse can place an eSignature**    Nurse 2 eSignature: Electronically signed by Vianey Burden RN on 4/29/25 at 6:03 PM EDT

## 2025-04-29 NOTE — OP NOTE
Operative Report    PATIENT NAME: Eh Price  YOB: 1970  MEDICAL RECORD# [id number]  SURGERY DATE: 4/29/2025  SURGEON:  Yamini Benavides MD  Co-Surgeon:     Kevin Mcgee MD (for ACDF exposure/approach)  ASSISTANT:  Staff  DICTATED BY:  Yamini Benavides MD        PREOPERATIVE DIAGNOSIS:  1.  Cervical Degenerative Disc Disease and Herniated Cervical Disc at C4-5.  2.   Right carpal tunnel     POSTOPERATIVE DIAGNOSIS:  1.  Same    PROCEDURES PERFORMED:  1.  Anterior cervical diskectomy C4-C5 with decompression of spinal cord and nerve roots  2.  Anterior cervical arthrodesis with ACIS ProTi 6 mm  graft filled with PliaFx allograft C4-C5  3.  Anterior cervical plating C4-C5 with Synthes plating  4.  Microscopic dissection  5.  Intraoperative fluoroscopy  6.   Intraoperative neuromonitoring (MEP, SSEP, EEG)   7.   Right carpal tunnel release    ANESTHESIA:  General    IMPLANTS:   Synthes ACIS ProTi allograft cage, 6 mm  Synthes 14 mm (#4) titanium screws and Synthes plate    COMPLICATIONS:  None    INDICATIONS FOR SURGERY:  The patient is a 54 y.o. yo patient with neck and arm pain.  Their symptoms failed to respond to conservative intervention.  An MRI scan was performed and this showed evidence of disk herniations at C4-5 level. I discussed the risks and benefits to include (but not limited to): Bleeding, infection, failure to relieve her pain, adjacent level degeneration, need for further surgery, spinal cord injury, numbness or weakness of the upper extremities, paralysis, esophageal injury, and hoarseness of voice. Having failed conservative management and experiencing persistent symptoms, the patient elected to proceed with anterior cervical discectomy with stabilization and fusion at C4-5.    DETAILS OF PROCEDURE:  Under universal basic protocol, the patient was brought to the operating room and placed under general anesthesia. Intravenous antibiotics were given by anesthesia, Ancef 2 g. They were  distally.  The proximal portion of the ligament was elevated identifying the median nerve deeply and completely incised proximally into the wrist and beyond the wrist crease with scissors.  The wound was then irrigated with antibiotic solution and closed in the usual fashion.  A sterile dry gauze dressing was then applied.  The patient was transferred to the recovery room in stable condition and there were no complications.    The patient was then awakened from anesthesia and taken to the recovery room in stable condition.  The sponge, needle and instrument counts were correct at the end of the case.  All neuromonitoring remained stable throughout the case.    In conformance with CMS regulations, I affirm I was present in the operating room during the entire procedure.    ESTIMATED BLOOD LOSS: 25 mL    COMPLICATIONS: No complications apparent.    DRAINS: Medium hemovac    DISPOSITION: The patient was transferred to the PACU in stable condition.     Dictated by: Yamini Benavides MD

## 2025-04-29 NOTE — PLAN OF CARE
Problem: Discharge Planning  Goal: Discharge to home or other facility with appropriate resources  Outcome: Progressing  Flowsheets (Taken 4/29/2025 1700)  Discharge to home or other facility with appropriate resources:   Identify barriers to discharge with patient and caregiver   Arrange for needed discharge resources and transportation as appropriate   Identify discharge learning needs (meds, wound care, etc)   Arrange for interpreters to assist at discharge as needed   Refer to discharge planning if patient needs post-hospital services based on physician order or complex needs related to functional status, cognitive ability or social support system     Problem: Pain  Goal: Verbalizes/displays adequate comfort level or baseline comfort level  Outcome: Progressing  Flowsheets (Taken 4/29/2025 1658)  Verbalizes/displays adequate comfort level or baseline comfort level:   Encourage patient to monitor pain and request assistance   Assess pain using appropriate pain scale   Administer analgesics based on type and severity of pain and evaluate response   Implement non-pharmacological measures as appropriate and evaluate response   Consider cultural and social influences on pain and pain management   Notify Licensed Independent Practitioner if interventions unsuccessful or patient reports new pain     Problem: Safety - Adult  Goal: Free from fall injury  Outcome: Progressing     Problem: ABCDS Injury Assessment  Goal: Absence of physical injury  Outcome: Progressing  Flowsheets (Taken 4/29/2025 1846)  Absence of Physical Injury: Implement safety measures based on patient assessment

## 2025-04-29 NOTE — PROGRESS NOTES
Patient placed in clinical discharge. Patient is alert and oriented. VSS. Patient is waiting for available bed on 5T.

## 2025-04-29 NOTE — OP NOTE
Operative Note      Patient: Eh Price  YOB: 1970  MRN: [id number]    Date of Procedure: 4/29/2025    Pre-Op Diagnosis Codes:      * Degenerative cervical disc [M50.30]    Post-Op Diagnosis: Same       Procedure(s):  CERVICAL 4 - CERVICAL 5 ANTERIOR CERVICAL DISCECTOMY AND FUSION ; AND RIGHT CARPAL TUNNEL RELEASE  .    Surgeon(s):  Yamini Benavides MD Zimmer, Lee Alexander, MD    Assistant:   Surgical Assistant: Johnathon Do    Anesthesia: General    Estimated Blood Loss (mL): Minimal    Complications: None    Specimens:   * No specimens in log *    Implants:  Implant Name Type Inv. Item Serial No.  Lot No. LRB No. Used Action   ALLOGRAFT BNE 1 CC FIBER PLIAFX PRIM - [id number]  ALLOGRAFT BNE 1 CC [employer]IA[employer] PRIM [id number] [facility] TISSUE BANK-   1 Implanted         Drains: * No LDAs found *    Findings:  Infection Present At Time Of Surgery (PATOS) (choose all levels that have infection present):  No infection present  Other Findings: Non contributory    Detailed Description of Procedure:     The patient came to the operating room, was placed in  supine position on the operating room table.  General IV anesthesia was given  until a deep plane of anesthesia was obtained.  At that point, an  endotracheal tube was placed by anesthesiology service without difficulty.    The patient was then prepped and draped in routine fashion.  Surgery began with a horizontal incision on the right.  This was at the mid  level of the neck.  This was carried down into the subcutaneous tissues with  a 15 blade.  Bovie cautery was used to cauterize down to the level of the  platysma.  Subplatysmal planes were then elevated superiorly and inferiorly  and a Weitlaner was placed.  Fibrofatty tissue was then incised in a vertical  fashion.  This allowed palpation of the carotid sheath and the pharynx and  larynx.  Kitner dissection was then performed down to the spine.  The  pharynx,

## 2025-04-29 NOTE — PROGRESS NOTES
Patient admitted from PACU to bed 5518. Admission assessment completed and documented on flowsheets. Four eyes skin assessment completed with KARISSA Winters. VSS, will continue to monitor.

## 2025-04-29 NOTE — PROGRESS NOTES
PACU Transfer to Floor Note    Procedure(s):  CERVICAL 4 - CERVICAL 5 ANTERIOR CERVICAL DISCECTOMY AND FUSION ; AND RIGHT CARPAL TUNNEL RELEASE  .    Current Allergies: Bee venom, Other, Nuts [peanut-containing drug products], Paxil [paroxetine hcl], and Nickel    Pt meets criteria as per Leon Score and ASPAN Standards to transfer to next phase of care.     No results for input(s): \"POCGLU\" in the last 72 hours.    Vitals:    04/29/25 1600   BP: (!) 130/93   Pulse: 75   Resp: 13   Temp: 97.6 °F (36.4 °C)   SpO2: 99%     Vitals within 20% of pt's admission vitals as per LEON SCORE    SpO2: 99 %    O2 Flow Rate (L/min): 6 L/min      Intake/Output Summary (Last 24 hours) at 4/29/2025 1611  Last data filed at 4/29/2025 1006  Gross per 24 hour   Intake 1600 ml   Output 50 ml   Net 1550 ml       Pain assessment:  present - adequately treated    Pain Level: 3 (tolerable)    Patient was assessed for alterations to skin integrity. There were not alterations observed.    Is patient incontinent: no    Patient has all belongings at discharge from PACU.  PACU RN called and updated patient's family.     Handoff report given at bedside.   Family updated and directed to pt room 5518  **[facility] TRANSPORTED PATIENT TO UNIT.       4/29/2025 4:11 PM

## 2025-04-29 NOTE — INTERVAL H&P NOTE
Update History & Physical    The patient's History and Physical of April 25, 2025 was reviewed with the patient and I examined the patient. There was no change. The surgical site was confirmed by the patient and me.     Plan: The risks, benefits, expected outcome, and alternative to the recommended procedure have been discussed with the patient. Patient understands and wants to proceed with the procedure.     Electronically signed by ILEANA BORJA MD on 4/29/2025 at 7:30 AM

## 2025-04-30 VITALS
WEIGHT: 196 LBS | HEIGHT: 69 IN | TEMPERATURE: 98.1 F | RESPIRATION RATE: 16 BRPM | BODY MASS INDEX: 29.03 KG/M2 | OXYGEN SATURATION: 98 % | DIASTOLIC BLOOD PRESSURE: 91 MMHG | HEART RATE: 74 BPM | SYSTOLIC BLOOD PRESSURE: 129 MMHG

## 2025-04-30 PROBLEM — Z98.1 S/P CERVICAL SPINAL FUSION: Status: ACTIVE | Noted: 2025-04-30

## 2025-04-30 LAB
ANION GAP SERPL CALCULATED.3IONS-SCNC: 12 MMOL/L (ref 3–16)
ANION GAP SERPL CALCULATED.3IONS-SCNC: 14 MMOL/L (ref 3–16)
BASOPHILS # BLD: 0 K/UL (ref 0–0.2)
BASOPHILS NFR BLD: 0.1 %
BUN SERPL-MCNC: 11 MG/DL (ref 7–20)
BUN SERPL-MCNC: 13 MG/DL (ref 7–20)
CALCIUM SERPL-MCNC: 10.1 MG/DL (ref 8.3–10.6)
CALCIUM SERPL-MCNC: 10.3 MG/DL (ref 8.3–10.6)
CHLORIDE SERPL-SCNC: 102 MMOL/L (ref 99–110)
CHLORIDE SERPL-SCNC: 105 MMOL/L (ref 99–110)
CO2 SERPL-SCNC: 20 MMOL/L (ref 21–32)
CO2 SERPL-SCNC: 27 MMOL/L (ref 21–32)
CREAT SERPL-MCNC: 0.9 MG/DL (ref 0.9–1.3)
CREAT SERPL-MCNC: 1 MG/DL (ref 0.9–1.3)
DEPRECATED RDW RBC AUTO: 17.4 % (ref 12.4–15.4)
EOSINOPHIL # BLD: 0 K/UL (ref 0–0.6)
EOSINOPHIL NFR BLD: 0 %
GFR SERPLBLD CREATININE-BSD FMLA CKD-EPI: 88 ML/MIN/{1.73_M2}
GFR SERPLBLD CREATININE-BSD FMLA CKD-EPI: >90 ML/MIN/{1.73_M2}
GLUCOSE SERPL-MCNC: 140 MG/DL (ref 70–99)
GLUCOSE SERPL-MCNC: 99 MG/DL (ref 70–99)
HCT VFR BLD AUTO: 40.8 % (ref 40.5–52.5)
HGB BLD-MCNC: 13.7 G/DL (ref 13.5–17.5)
LYMPHOCYTES # BLD: 1.3 K/UL (ref 1–5.1)
LYMPHOCYTES NFR BLD: 8.6 %
MCH RBC QN AUTO: 30.1 PG (ref 26–34)
MCHC RBC AUTO-ENTMCNC: 33.6 G/DL (ref 31–36)
MCV RBC AUTO: 89.5 FL (ref 80–100)
MONOCYTES # BLD: 0.9 K/UL (ref 0–1.3)
MONOCYTES NFR BLD: 6 %
NEUTROPHILS # BLD: 13.4 K/UL (ref 1.7–7.7)
NEUTROPHILS NFR BLD: 85.3 %
PLATELET # BLD AUTO: 238 K/UL (ref 135–450)
PMV BLD AUTO: 7.8 FL (ref 5–10.5)
POTASSIUM SERPL-SCNC: 4.2 MMOL/L (ref 3.5–5.1)
POTASSIUM SERPL-SCNC: 4.5 MMOL/L (ref 3.5–5.1)
RBC # BLD AUTO: 4.56 M/UL (ref 4.2–5.9)
SODIUM SERPL-SCNC: 139 MMOL/L (ref 136–145)
SODIUM SERPL-SCNC: 141 MMOL/L (ref 136–145)
WBC # BLD AUTO: 15.7 K/UL (ref 4–11)

## 2025-04-30 PROCEDURE — G0378 HOSPITAL OBSERVATION PER HR: HCPCS

## 2025-04-30 PROCEDURE — 97161 PT EVAL LOW COMPLEX 20 MIN: CPT

## 2025-04-30 PROCEDURE — 2500000003 HC RX 250 WO HCPCS: Performed by: STUDENT IN AN ORGANIZED HEALTH CARE EDUCATION/TRAINING PROGRAM

## 2025-04-30 PROCEDURE — 97530 THERAPEUTIC ACTIVITIES: CPT

## 2025-04-30 PROCEDURE — 6370000000 HC RX 637 (ALT 250 FOR IP): Performed by: NURSE PRACTITIONER

## 2025-04-30 PROCEDURE — 36415 COLL VENOUS BLD VENIPUNCTURE: CPT

## 2025-04-30 PROCEDURE — 6370000000 HC RX 637 (ALT 250 FOR IP)

## 2025-04-30 PROCEDURE — 6370000000 HC RX 637 (ALT 250 FOR IP): Performed by: STUDENT IN AN ORGANIZED HEALTH CARE EDUCATION/TRAINING PROGRAM

## 2025-04-30 PROCEDURE — 85025 COMPLETE CBC W/AUTO DIFF WBC: CPT

## 2025-04-30 PROCEDURE — 97166 OT EVAL MOD COMPLEX 45 MIN: CPT

## 2025-04-30 PROCEDURE — 80048 BASIC METABOLIC PNL TOTAL CA: CPT

## 2025-04-30 PROCEDURE — 97535 SELF CARE MNGMENT TRAINING: CPT

## 2025-04-30 PROCEDURE — 94640 AIRWAY INHALATION TREATMENT: CPT

## 2025-04-30 PROCEDURE — 97116 GAIT TRAINING THERAPY: CPT

## 2025-04-30 PROCEDURE — 6360000002 HC RX W HCPCS: Performed by: STUDENT IN AN ORGANIZED HEALTH CARE EDUCATION/TRAINING PROGRAM

## 2025-04-30 PROCEDURE — APPNB180 APP NON BILLABLE TIME > 60 MINS: Performed by: NURSE PRACTITIONER

## 2025-04-30 PROCEDURE — 99024 POSTOP FOLLOW-UP VISIT: CPT | Performed by: NURSE PRACTITIONER

## 2025-04-30 PROCEDURE — 96372 THER/PROPH/DIAG INJ SC/IM: CPT

## 2025-04-30 RX ORDER — OXYCODONE HYDROCHLORIDE 5 MG/1
5 TABLET ORAL EVERY 6 HOURS PRN
Qty: 28 TABLET | Refills: 0 | Status: SHIPPED | OUTPATIENT
Start: 2025-04-30 | End: 2025-04-30

## 2025-04-30 RX ORDER — SENNA AND DOCUSATE SODIUM 50; 8.6 MG/1; MG/1
2 TABLET, FILM COATED ORAL 2 TIMES DAILY
COMMUNITY
Start: 2025-04-30

## 2025-04-30 RX ORDER — SENNA AND DOCUSATE SODIUM 50; 8.6 MG/1; MG/1
2 TABLET, FILM COATED ORAL 2 TIMES DAILY
Status: DISCONTINUED | OUTPATIENT
Start: 2025-04-30 | End: 2025-04-30 | Stop reason: HOSPADM

## 2025-04-30 RX ORDER — FLUTICASONE PROPIONATE 50 MCG
SPRAY, SUSPENSION (ML) NASAL
Qty: 16 ML | Refills: 2 | OUTPATIENT
Start: 2025-04-30

## 2025-04-30 RX ORDER — OXYCODONE HYDROCHLORIDE 5 MG/1
5-10 TABLET ORAL EVERY 6 HOURS PRN
Qty: 56 TABLET | Refills: 0 | Status: SHIPPED | OUTPATIENT
Start: 2025-04-30 | End: 2025-05-07

## 2025-04-30 RX ORDER — PREDNISONE 2.5 MG/1
TABLET ORAL
Qty: 6 TABLET | Refills: 0 | Status: SHIPPED | OUTPATIENT
Start: 2025-04-30 | End: 2025-05-04

## 2025-04-30 RX ADMIN — BACLOFEN 10 MG: 10 TABLET ORAL at 08:39

## 2025-04-30 RX ADMIN — PANTOPRAZOLE SODIUM 40 MG: 40 TABLET, DELAYED RELEASE ORAL at 05:40

## 2025-04-30 RX ADMIN — ENOXAPARIN SODIUM 40 MG: 100 INJECTION SUBCUTANEOUS at 08:39

## 2025-04-30 RX ADMIN — OXYCODONE 10 MG: 5 TABLET ORAL at 10:26

## 2025-04-30 RX ADMIN — GABAPENTIN 800 MG: 400 CAPSULE ORAL at 08:39

## 2025-04-30 RX ADMIN — NYSTATIN 500000 UNITS: 100000 SUSPENSION ORAL at 14:00

## 2025-04-30 RX ADMIN — LISINOPRIL 10 MG: 10 TABLET ORAL at 08:39

## 2025-04-30 RX ADMIN — ARFORMOTEROL TARTRATE: 15 SOLUTION RESPIRATORY (INHALATION) at 08:12

## 2025-04-30 RX ADMIN — WATER 2000 MG: 1 INJECTION INTRAMUSCULAR; INTRAVENOUS; SUBCUTANEOUS at 08:40

## 2025-04-30 RX ADMIN — OXYCODONE 10 MG: 5 TABLET ORAL at 14:00

## 2025-04-30 RX ADMIN — SENNOSIDES, DOCUSATE SODIUM 2 TABLET: 50; 8.6 TABLET, FILM COATED ORAL at 13:59

## 2025-04-30 RX ADMIN — WATER 2000 MG: 1 INJECTION INTRAMUSCULAR; INTRAVENOUS; SUBCUTANEOUS at 01:21

## 2025-04-30 RX ADMIN — OXYCODONE 10 MG: 5 TABLET ORAL at 02:18

## 2025-04-30 RX ADMIN — NYSTATIN 500000 UNITS: 100000 SUSPENSION ORAL at 08:39

## 2025-04-30 RX ADMIN — GABAPENTIN 800 MG: 400 CAPSULE ORAL at 14:00

## 2025-04-30 RX ADMIN — OXYCODONE 10 MG: 5 TABLET ORAL at 06:32

## 2025-04-30 RX ADMIN — PREDNISONE 2.5 MG: 5 TABLET ORAL at 08:39

## 2025-04-30 ASSESSMENT — PAIN DESCRIPTION - ORIENTATION
ORIENTATION: ANTERIOR
ORIENTATION: RIGHT;ANTERIOR;POSTERIOR
ORIENTATION: RIGHT;ANTERIOR;POSTERIOR
ORIENTATION: ANTERIOR

## 2025-04-30 ASSESSMENT — PAIN SCALES - GENERAL
PAINLEVEL_OUTOF10: 5
PAINLEVEL_OUTOF10: 4
PAINLEVEL_OUTOF10: 8
PAINLEVEL_OUTOF10: 2
PAINLEVEL_OUTOF10: 7
PAINLEVEL_OUTOF10: 5
PAINLEVEL_OUTOF10: 8
PAINLEVEL_OUTOF10: 7
PAINLEVEL_OUTOF10: 4

## 2025-04-30 ASSESSMENT — PAIN - FUNCTIONAL ASSESSMENT
PAIN_FUNCTIONAL_ASSESSMENT: PREVENTS OR INTERFERES SOME ACTIVE ACTIVITIES AND ADLS

## 2025-04-30 ASSESSMENT — PAIN DESCRIPTION - PAIN TYPE
TYPE: SURGICAL PAIN

## 2025-04-30 ASSESSMENT — PAIN DESCRIPTION - DESCRIPTORS
DESCRIPTORS: ACHING

## 2025-04-30 ASSESSMENT — PAIN DESCRIPTION - LOCATION
LOCATION: NECK
LOCATION: NECK;WRIST
LOCATION: NECK
LOCATION: NECK;WRIST

## 2025-04-30 ASSESSMENT — PAIN DESCRIPTION - FREQUENCY
FREQUENCY: CONTINUOUS

## 2025-04-30 ASSESSMENT — PAIN DESCRIPTION - ONSET
ONSET: ON-GOING

## 2025-04-30 NOTE — PROGRESS NOTES
Occupational Therapy  Facility/Department: [facility] ORTHO/NEURO  Occupational Therapy Initial Assessment/tx/discharge    Name: Eh Price  : 1970  MRN: [id number]  Date of Service: 2025    Discharge Recommendations:  Home with assist PRN  OT Equipment Recommendations  Equipment Needed: No       Patient Diagnosis(es): The encounter diagnosis was S/P cervical spinal fusion.  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  Past Surgical History:  has a past surgical history that includes Anterior cervical discectomy; Nose surgery; Wrist surgery (Left); sinus surgery; Anal fistulotomy; back surgery; eye surgery; Dental surgery; Wrist fracture surgery (Left); cervical fusion (N/A, 2025); and Carpal tunnel release (Right, 2025).           Assessment  Assessment: Pt admitted with degenerative cervical disc dz, to OR  for CERVICAL 4 - CERVICAL 5 ANTERIOR CERVICAL DISCECTOMY AND FUSION ; AND RIGHT CARPAL TUNNEL RELEASE.  He is indep with ADLs and functional transfers/mobility without A device.  Pt is aware of spinal prec and R UE prec s/p CTR.  OT educated patient in work simplification, work station ergonomics, and he verbalized understanding and agreement.  Pt resides with his parents and reports his mother can do home mgt.  No further acute OT needs.  Recommend discharge to home with PRN A.  Decision Making: Medium Complexity  REQUIRES OT FOLLOW-UP: No  Activity Tolerance  Activity Tolerance: Patient Tolerated treatment well

## 2025-04-30 NOTE — PROGRESS NOTES
Physical Therapy  Facility/Department: [facility] 5T ORTHO/NEURO  Physical Therapy Initial Assessment    Name: Eh Price  : 1970  MRN: [id number]  Date of Service: 2025    Discharge Recommendations:  Home with assist PRN, No therapy recommended at discharge   PT Equipment Recommendations  Equipment Needed: No      Patient Diagnosis(es): The encounter diagnosis was S/P cervical spinal fusion.  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  Past Surgical History:  has a past surgical history that includes Anterior cervical discectomy; Nose surgery; Wrist surgery (Left); sinus surgery; Anal fistulotomy; back surgery; eye surgery; Dental surgery; Wrist fracture surgery (Left); cervical fusion (N/A, 2025); and Carpal tunnel release (Right, 2025).    Assessment  Body Structures, Functions, Activity Limitations Requiring Skilled Therapeutic Intervention: Decreased functional mobility ;Decreased ADL status;Decreased strength;Decreased balance;Increased pain;Decreased safe awareness  Assessment: Patient demonstrates functional mobility including (S) for gait without an assistive device, SBA for stair negotiation s/p cervical surgery.  Patient is (I) at baseline, does not use an assistive device.  Patient lives with parents at baseline, (I) without an assistive device.  Patient planning to d/c home with (A) from parents as needed - no additional PT warranted upon d/c.  Anticipate progress upon d/c with mobility.  Patient will continue  Home: House  Home Layout: One level (with finished basement which patient utilizes - has kitchen and bedroom on basement level - laundry and bathroom on main level of home)  Home Access: Level entry, Stairs to enter with rails (elevator to lower level is currently not working)  Entrance Stairs - Number of Steps: 13 stairs with 1 rail to access basement living space  Bathroom Shower/Tub: Tub/Shower unit, Walk-in shower  Bathroom Toilet: Handicap height  Bathroom Equipment: Shower chair, Grab bars in shower, Grab bars around toilet (walk-in shower with grab bars)  Home Equipment: Walker - Standard, Wheelchair - Manual  Has the patient had two or more falls in the past year or any fall with injury in the past year?: No  Prior Level of Assist for ADLs: Independent  Prior Level of Assist for Homemaking: Independent (mother planning to assist upon d/c; has meals prepped/frozen)  Prior Level of Assist for Ambulation: Independent household ambulator, with or without device, Independent community ambulator, with or without device (community mobility without an assistive device at baseline)  Prior Level of Assist for Transfers: Independent  Active : Yes  Occupation: On disability (was on disability)  Type of Occupation: [employer]  Additional Comments: can stay on main level if needed but is prepared and hopeful to access basement  Vision/Hearing  Vision  Vision: Within Functional Limits (recent catarct surgery)  Hearing  Hearing: Within functional limits    Cognition   Orientation  Overall Orientation Status: Within Functional Limits  Orientation Level: Oriented X4  Cognition  Overall Cognitive Status: WFL  Cognition Comment: tangential at times, requires redirection and cues to maintain spinal precautions    Objective  Temp: 98.1 °F (36.7 °C)  Pulse: 65  Heart Rate Source: Monitor  Respirations: 16  SpO2: 97 %  O2 Device: None (Room air)  BP: 136/81  MAP (Calculated): 99  BP Location: Left

## 2025-04-30 NOTE — PROGRESS NOTES
Hospital Medicine Progress Note      Date of Admission: 4/29/2025  Hospital Day: 2    Chief Admission Complaint: Cervical spine surgery     Subjective: Patient seen and examined at bedside  Denies any complaints  Blood pressure good      Presenting Admission History:       Eh Price is a 54 y.o. male with PMH of hypertension, migraine, COPD, GERD, low back pain presented for elective C-spine surgery and right carpal tunnel release.  He underwent procedure on 4/29 and admitted for further management  Hospitalist consulted for medical management    Assessment/Plan:      Cervical degenerative disc disease and herniated cervical disc s/p C4-C5 anterior cervical discectomy and fusion 4/29  Right carpal tunnel syndrome s/p right carpal tunnel release 4/29  Patient admitted under neurosurgery  Monitor drain output  PT and OT recommend home with assist as needed     Mild leukocytosis  Could be reactive, will continue to monitor    Mild acidosis  We will continue to monitor    Essential hypertension  Continue lisinopril     Low back pain  Resume gabapentin, baclofen, Valium     GERD, on PPI  COPD, on breathing treatment  Oral thrush, on nystatin swish and swallow  History of muscle cramps, follows up with rheumatology and is on prednisone 2.5 mg twice daily.  Resume    Physical Exam Performed:      General: NAD  Eyes: EOMI  ENT: neck supple, oral thrush  Cardiovascular: Regular rate.  Respiratory: Clear to auscultation  Gastrointestinal: Soft, non tender  Genitourinary: no suprapubic tenderness  Musculoskeletal: No edema, right forearm and hand in Ace wrap  Skin: warm, dry, neck drain with serosanguineous output  Neuro: Alert.  Psych: Mood appropriate.     /81   Pulse 65   Temp 98.1 °F (36.7 °C)   Resp 16   Ht 1.753 m (5' 9\")   Wt 88.9 kg (196 lb)   SpO2 98%   BMI 28.94 kg/m²     Diet: ADULT DIET; Regular  DVT Prophylaxis: []PPx LMWH  []SQ Heparin  [x]IPC/SCDs  []Eliquis  []Xarelto  []Coumadin  []Other -   No results found for: \"BC\"  No results found for: \"BLOODCULT2\"  Organism: No results found for: \"ORG\"      Cheryl Contreras MD

## 2025-04-30 NOTE — PLAN OF CARE
Problem: Discharge Planning  Goal: Discharge to home or other facility with appropriate resources  4/30/2025 1435 by Wellington Sosa RN  Outcome: Completed  4/30/2025 1010 by Wellington Sosa RN  Outcome: Progressing     Problem: Pain  Goal: Verbalizes/displays adequate comfort level or baseline comfort level  4/30/2025 1435 by Wellington Sosa RN  Outcome: Completed  4/30/2025 1010 by Wellington Sosa RN  Outcome: Progressing  Flowsheets (Taken 4/30/2025 0837)  Verbalizes/displays adequate comfort level or baseline comfort level:   Encourage patient to monitor pain and request assistance   Assess pain using appropriate pain scale   Administer analgesics based on type and severity of pain and evaluate response   Implement non-pharmacological measures as appropriate and evaluate response   Consider cultural and social influences on pain and pain management   Notify Licensed Independent Practitioner if interventions unsuccessful or patient reports new pain     Problem: Safety - Adult  Goal: Free from fall injury  4/30/2025 1435 by Wellington Sosa RN  Outcome: Completed  4/30/2025 1010 by Wellington Sosa RN  Outcome: Progressing     Problem: ABCDS Injury Assessment  Goal: Absence of physical injury  4/30/2025 1435 by Wellington Sosa RN  Outcome: Completed  4/30/2025 1010 by Wellington Sosa RN  Outcome: Progressing  Flowsheets (Taken 4/30/2025 1009)  Absence of Physical Injury: Implement safety measures based on patient assessment

## 2025-04-30 NOTE — PROGRESS NOTES
Patient A&O x4. VSS. Medications managed per MAR. Ambulates SBA with BRP. Hemovac draining. Safety precautions in place, bedside table and call light within reach.   yes

## 2025-04-30 NOTE — PLAN OF CARE
Problem: Discharge Planning  Goal: Discharge to home or other facility with appropriate resources  4/29/2025 2150 by Benitez Borges, RN  Outcome: Progressing  Note: Pt involved in discharge planning. Barriers to discharge discussed with patient. Discharge learning needs identified. Discuss with patient any additional needed resources and transportation plans. Case management following plan of care.       Problem: Safety - Adult  Goal: Free from fall injury  4/29/2025 2150 by Benitez Borges, RN  Outcome: Progressing  Note: All fall precautions in place. Bed locked and in lowest position with alarm on. Overbed table and personal belonings within reach. Call light within reach and patient instructed to use call light for assistance. Non-skid socks on.

## 2025-04-30 NOTE — DISCHARGE SUMMARY
Discharge Summary    Date of Admission: 4/29/2025  6:07 AM  Date of Discharge: 4/30/25  Admission Diagnosis: Degenerative cervical disc [M50.30]  Discharge Diagnosis: Same   Condition on Discharge: good  Attending for Admission: Yamini Benavides MD  Procedures: Procedure(s) (LRB):  CERVICAL 4 - CERVICAL 5 ANTERIOR CERVICAL DISCECTOMY AND FUSION ; AND RIGHT CARPAL TUNNEL RELEASE (N/A)  . (Right)  Consults: IP CONSULT TO HOSPITALIST    Reason for Admission:  Eh Price is a 54 y.o. male patient who was admitted to the hospital for elective ACDF and carpal tunnel. He underwent the procedure listed above on 4/30/25.     Hospital Course:  After surgery, . He complained of incisional pain but was swallowing fine. The pain was well-controlled on oral medications.  The incision was clean, dry and intact. There was no erythema or edema around the surgical site. Prior to discharge He was eating well, urinating and ambulating with a steady gait.    Discharge Vitals/Labs:  /81   Pulse 65   Temp 98.1 °F (36.7 °C) (Oral)   Resp 16   Ht 1.753 m (5' 9\")   Wt 88.9 kg (196 lb)   SpO2 97%   BMI 28.94 kg/m²   CBC:   Lab Results   Component Value Date/Time    WBC 15.7 04/30/2025 05:54 AM    RBC 4.56 04/30/2025 05:54 AM    HGB 13.7 04/30/2025 05:54 AM    HCT 40.8 04/30/2025 05:54 AM    MCV 89.5 04/30/2025 05:54 AM    MCH 30.1 04/30/2025 05:54 AM    MCHC 33.6 04/30/2025 05:54 AM    RDW 17.4 04/30/2025 05:54 AM     04/30/2025 05:54 AM    MPV 7.8 04/30/2025 05:54 AM     BMP:    Lab Results   Component Value Date/Time     04/30/2025 05:53 AM    K 4.2 04/30/2025 05:53 AM     04/30/2025 05:53 AM    CO2 20 04/30/2025 05:53 AM    BUN 11 04/30/2025 05:53 AM    CREATININE 0.9 04/30/2025 05:53 AM    CALCIUM 10.1 04/30/2025 05:53 AM    LABGLOM >90 04/30/2025 05:53 AM    LABGLOM >60 03/22/2024 05:16 AM    GLUCOSE 140 04/30/2025 05:53 AM       Discharge Medications:  The patient suffers from a major

## 2025-04-30 NOTE — PLAN OF CARE
Problem: Discharge Planning  Goal: Discharge to home or other facility with appropriate resources  4/30/2025 1010 by Wellington Sosa RN  Outcome: Progressing  4/29/2025 2150 by Benitez Borges RN  Outcome: Progressing  Note: Pt involved in discharge planning. Barriers to discharge discussed with patient. Discharge learning needs identified. Discuss with patient any additional needed resources and transportation plans. Case management following plan of care.       Problem: Pain  Goal: Verbalizes/displays adequate comfort level or baseline comfort level  4/30/2025 1010 by Wellington Sosa RN  Outcome: Progressing  Flowsheets (Taken 4/30/2025 0837)  Verbalizes/displays adequate comfort level or baseline comfort level:   Encourage patient to monitor pain and request assistance   Assess pain using appropriate pain scale   Administer analgesics based on type and severity of pain and evaluate response   Implement non-pharmacological measures as appropriate and evaluate response   Consider cultural and social influences on pain and pain management   Notify Licensed Independent Practitioner if interventions unsuccessful or patient reports new pain  4/29/2025 2150 by Benitez Borges RN  Outcome: Progressing     Problem: Safety - Adult  Goal: Free from fall injury  4/30/2025 1010 by Wellington Sosa RN  Outcome: Progressing  4/29/2025 2150 by Benitez Borges RN  Outcome: Progressing  Note: All fall precautions in place. Bed locked and in lowest position with alarm on. Overbed table and personal belonings within reach. Call light within reach and patient instructed to use call light for assistance. Non-skid socks on.       Problem: ABCDS Injury Assessment  Goal: Absence of physical injury  4/30/2025 1010 by Wellington Sosa RN  Outcome: Progressing  Flowsheets (Taken 4/30/2025 1009)  Absence of Physical Injury: Implement safety measures based on patient assessment  4/29/2025 2150 by Benitez Borges, RN  Outcome:  Progressing

## 2025-04-30 NOTE — CARE COORDINATION
Case Management Assessment  Initial Evaluation    Date/Time of Evaluation: 4/30/2025 11:45 AM  Assessment Completed by: Norma Guzmán RN    If patient is discharged prior to next notation, then this note serves as note for discharge by case management.    Patient Name: Eh Price                   YOB: 1970  Diagnosis: Degenerative cervical disc [M50.30]  Cervical radiculopathy [M54.12]                   Date / Time: 4/29/2025  6:07 AM    Patient Admission Status: Inpatient   Readmission Risk (Low < 19, Mod (19-27), High > 27): Readmission Risk Score: 8.3    Current PCP: Ernestina Anne PA-C  PCP verified by CM? No    Chart Reviewed: Yes      History Provided by: Patient  Patient Orientation: Alert and Oriented    Patient Cognition: Alert    Hospitalization in the last 30 days (Readmission):  No    If yes, Readmission Assessment in CM Navigator will be completed.    Advance Directives:      Code Status: Full Code   Patient's Primary Decision Maker is: Legal Next of Kin      Discharge Planning:    Patient lives with: Family Members Type of Home: House  Primary Care Giver: Self  Patient Support Systems include: Family Members   Current Financial resources: None  Current community resources: None  Current services prior to admission: None            Current DME:              Type of Home Care services:  None    ADLS  Prior functional level: Independent in ADLs/IADLs  Current functional level: Independent in ADLs/IADLs    PT AM-PAC:   /24  OT AM-PAC:   /24    Family can provide assistance at DC: Yes  Would you like Case Management to discuss the discharge plan with any other family members/significant others, and if so, who? No  Plans to Return to Present Housing: Yes  Other Identified Issues/Barriers to RETURNING to current housing: none  Potential Assistance needed at discharge: N/A            Potential DME:    Patient expects to discharge to: House  Plan for transportation at discharge:  Self    Financial    [place] [employer] / Plan[place] / Product Type: *No Product type* /     Does insurance require precert for SNF: Yes    Potential assistance Purchasing Medications:    Meds-to-Beds request: Yes      [id number] - [place] - [address] - P [phone] - F [phone] [address] [place] [address]  Phone: [phone] Fax: [phone]      Notes:    Factors facilitating achievement of predicted outcomes: Family support    Barriers to discharge: Pain    Additional Case Management Notes: Patient is from home, independent pta.  No CM needs noted at this time.  Family to transport home.    The Plan for Transition of Care is related to the following treatment goals of Degenerative cervical disc [M50.30]  Cervical radiculopathy [M54.12]    IF APPLICABLE: The Patient and/or patient representative Eh and his family were provided with a choice of provider and agrees with the discharge plan. Freedom of choice list with basic dialogue that supports the patient's individualized plan of care/goals and shares the quality data associated with the providers was provided to: Patient   Patient Representative Name:       The Patient and/or Patient Representative Agree with the Discharge Plan? Yes    Norma Guzmán RN  Case Management Department  Ph: [phone] Fax: [phone]

## 2025-04-30 NOTE — PROGRESS NOTES
NEUROSURGERY POST-OP PROGRESS NOTE    Patient Name: Eh Price YOB: 1970   Sex: Male Age: 54 yrs     Medical Record Number: [id number] Acct Number: [id number]   Room Number: 5518/5518-01 Hospital Day: Hospital Day: 2     Interval History:  Post-operative Day#1 s/p Procedure(s) (LRB):  CERVICAL 4 - CERVICAL 5 ANTERIOR CERVICAL DISCECTOMY AND FUSION ; AND RIGHT CARPAL TUNNEL RELEASE (N/A)  . (Right)    Subjective: Pt sitting up in chair. He feels good , his pain is managed with his pain medications. Swallowing well.    Objective:    VITAL SIGNS   /81   Pulse 65   Temp 98.1 °F (36.7 °C) (Oral)   Resp 16   Ht 1.753 m (5' 9\")   Wt 88.9 kg (196 lb)   SpO2 97%   BMI 28.94 kg/m²    Height Height: 175.3 cm (5' 9\")   Weight Weight - Scale: 88.9 kg (196 lb)        Allergies Allergies   Allergen Reactions    Bee Venom Anaphylaxis, Hives and Swelling    Other Anaphylaxis and Hives    Nuts [Peanut-Containing Drug Products]     Paxil [Paroxetine Hcl] Other (See Comments)     Confusion and disorientation    Nickel Dermatitis and Rash      Diet ADULT DIET; Regular   Isolation No active isolations     LABS   Basic Metabolic Profile Recent Labs     04/30/25  0553      K 4.2      CO2 20*   BUN 11   CREATININE 0.9   GLUCOSE 140*   CALCIUM 10.1      Complete Blood Count Recent Labs     04/30/25  0554   WBC 15.7*   RBC 4.56   HGB 13.7   HCT 40.8         Coagulation Studies Recent Labs     04/29/25  0646   PROTIME 14.5   INR 1.11        MEDICATIONS   Inpatient Medications     sennosides-docusate sodium, 2 tablet, Oral, BID    baclofen, 10 mg, Oral, BID    arformoterol 15 mcg-budesonide 0.25 mg neb solution, , Nebulization, BID RT    gabapentin, 800 mg, Oral, TID    lisinopril, 10 mg, Oral, Daily    nystatin, 500,000 Units, Oral,

## 2025-04-30 NOTE — CONSULTS
V2.[place]  [facility] [place]      Name:  Eh Price /Age/Sex: 1970  (54 y.o. male)   MRN & CSN:  [id number] & [id number] Encounter Date/Time: 2025 9:09 PM EDT   Location:  [facility]- PCP: Ernestina Anne PA-C     Attending:Yamini Benavides MD  Consulting Provider: Cheryl Contreras MD      Hospital Day: 1    Assessment and Recommendations   Eh Price is a 54 y.o. male with pmh of hypertension, migraine, COPD, GERD, low back pain who presents with Cervical radiculopathy    Hospital Problems           Last Modified POA    * (Principal) Cervical radiculopathy 2025 Yes    Degenerative cervical disc 2025 Yes       Cervical degenerative disc disease and herniated cervical disc s/p C4-C5 anterior cervical discectomy and fusion   Right carpal tunnel syndrome s/p right carpal tunnel release   Patient admitted under neurosurgery  Monitor drain output  PT and OT in the morning  Postop labs ordered for a.m.    Essential hypertension  Resume lisinopril    Low back pain  Resume gabapentin, baclofen, Valium    GERD, on PPI  COPD, on breathing treatment  Oral thrush, on nystatin swish and swallow  History of muscle cramps, follows up with rheumatology and is on prednisone 2.5 mg twice daily.  Resume      Diet ADULT DIET; Regular   DVT Prophylaxis [] Lovenox, []  Heparin, [x] SCDs, [] Ambulation,  [] Eliquis, [] Xarelto   Code Status Full Code   Surrogate Decision Maker/ POA None     Personally reviewed Lab Studies and Imaging           History From:    History obtained from chart review and the patient.     History of Present Illness:      Chief Complaint: C-spine surgery    Eh Price is a 54 y.o. male with PMH of hypertension, migraine, COPD, GERD, low back pain presented for elective C-spine surgery and right carpal tunnel release.  He underwent procedure on  and admitted for further management  Hospitalist consulted for medical management      Review of Systems:   Received from [facility]    Social Connection and Isolation Panel [NHANES]     Frequency of Communication with Friends and Family: More than three times a week     Frequency of Social Gatherings with Friends and Family: Twice a week     Attends Anabaptist Services: Never     Active Member of Clubs or Organizations: No     Attends Club or Organization Meetings: Patient declined     Marital Status:    Housing Stability: Low Risk  (4/29/2025)    Housing Stability Vital Sign     Unable to Pay for Housing in the Last Year: No     Number of Times Moved in the Last Year: 0     Homeless in the Last Year: No         Medications:   Medications:    baclofen  10 mg Oral BID    arformoterol 15 mcg-budesonide 0.25 mg neb solution   Nebulization BID RT    gabapentin  800 mg Oral TID    [START ON 4/30/2025] lisinopril  10 mg Oral Daily    nystatin  500,000 Units Oral 4x Daily    [START ON 4/30/2025] pantoprazole  40 mg Oral QAM AC    ceFAZolin  2,000 mg IntraVENous Q8H    [START ON 4/30/2025] enoxaparin  40 mg SubCUTAneous Daily    predniSONE  2.5 mg Oral BID      Infusions:   PRN Meds: albuterol, 2.5 mg, Q4H PRN  diazePAM, 5 mg, Q6H PRN  fluticasone, 1 spray, Daily PRN  SUMAtriptan, 50 mg, Daily PRN  oxyCODONE, 5 mg, Q4H PRN   Or  oxyCODONE, 10 mg, Q4H PRN        Labs and Imaging   XR CERVICAL SPINE (2-3 VIEWS)  Result Date: 4/29/2025  EXAM: FLUORO FOR SURGICAL PROCEDURES, XR CERVICAL SPINE (2-3 VIEWS) INDICATION: CERVICAL 4 - CERVICAL 5 ANTERIOR CERVICAL DISCECTOMY AND FUSION, COMPARISON: None FINDINGS: Peak Skin dose: 1.9 to mGy Fluoroscopy was provided.  No radiologist was in attendance. Anterior cervical discectomy and fusion of C4-C5. Please see operative report for further details.     see above Electronically signed by Kahi ANDREWS FOR SURGICAL PROCEDURES  Result Date: 4/29/2025  EXAM: FLUORO FOR SURGICAL PROCEDURES, XR CERVICAL SPINE (2-3 VIEWS) INDICATION: CERVICAL 4 - CERVICAL 5 ANTERIOR CERVICAL

## 2025-05-12 ENCOUNTER — HOSPITAL ENCOUNTER (OUTPATIENT)
Dept: GENERAL RADIOLOGY | Age: 55
Discharge: HOME | End: 2025-05-12
Payer: COMMERCIAL

## 2025-05-12 ENCOUNTER — HOSPITAL ENCOUNTER (OUTPATIENT)
Age: 55
Discharge: HOME | End: 2025-05-12
Payer: COMMERCIAL

## 2025-05-12 DIAGNOSIS — M50.20 HERNIATED DISC, CERVICAL: ICD-10-CM

## 2025-05-12 PROCEDURE — 72040 X-RAY EXAM NECK SPINE 2-3 VW: CPT

## 2025-06-16 ENCOUNTER — HOSPITAL ENCOUNTER (OUTPATIENT)
Dept: GENERAL RADIOLOGY | Age: 55
Discharge: HOME | End: 2025-06-16
Payer: COMMERCIAL

## 2025-06-16 ENCOUNTER — HOSPITAL ENCOUNTER (OUTPATIENT)
Age: 55
Discharge: HOME | End: 2025-06-16
Payer: COMMERCIAL

## 2025-06-16 DIAGNOSIS — Z98.1 ARTHRODESIS STATUS: ICD-10-CM

## 2025-06-16 PROCEDURE — 72040 X-RAY EXAM NECK SPINE 2-3 VW: CPT

## 2025-06-26 ENCOUNTER — OFFICE VISIT (OUTPATIENT)
Dept: ORTHOPEDIC SURGERY | Age: 55
End: 2025-06-26
Payer: COMMERCIAL

## 2025-06-26 VITALS — WEIGHT: 195.99 LBS | BODY MASS INDEX: 29.03 KG/M2 | HEIGHT: 69 IN

## 2025-06-26 DIAGNOSIS — S40.011A CONTUSION OF RIGHT SHOULDER, INITIAL ENCOUNTER: Primary | ICD-10-CM

## 2025-06-26 PROCEDURE — 99214 OFFICE O/P EST MOD 30 MIN: CPT | Performed by: ORTHOPAEDIC SURGERY

## 2025-06-26 NOTE — PROGRESS NOTES
Frequency of Social Gatherings with Friends and Family: Twice a week     Attends Yarsanism Services: Never     Active Member of Clubs or Organizations: No     Attends Club or Organization Meetings: Patient declined     Marital Status:    Intimate Partner Violence: Not on file   Housing Stability: Low Risk  (4/29/2025)    Housing Stability Vital Sign     Unable to Pay for Housing in the Last Year: No     Number of Times Moved in the Last Year: 0     Homeless in the Last Year: No       Family History   Problem Relation Age of Onset    Diabetes type 2  Father     Diabetes type 2  Paternal Grandfather     Prostate Cancer Paternal Grandfather        Current Outpatient Medications on File Prior to Visit   Medication Sig Dispense Refill    sennosides-docusate sodium (SENOKOT-S) 8.6-50 MG tablet Take 2 tablets by mouth 2 times daily      gabapentin (NEURONTIN) 800 MG tablet 3 times daily.      baclofen (LIORESAL) 10 MG tablet Take 1 tablet by mouth 2 times daily      lisinopril (PRINIVIL;ZESTRIL) 10 MG tablet Take 1 tablet by mouth daily      diazePAM (VALIUM) 5 MG tablet Take 1 tablet by mouth every 6 hours as needed for Anxiety (Takes at night).      fluticasone-salmeterol (ADVAIR) 100-50 MCG/ACT AEPB diskus inhaler       Multiple Vitamins-Minerals (MULTIVITAMIN ADULTS 50+) TABS Take 1 tablet by mouth daily      fluticasone (FLONASE) 50 MCG/ACT nasal spray 2 sprays in each nostril daily. 16 g 2    albuterol sulfate HFA (PROVENTIL;VENTOLIN;PROAIR) 108 (90 Base) MCG/ACT inhaler Inhale 2 puffs into the lungs every 4 hours as needed      SUMAtriptan (IMITREX) 50 MG tablet TAKE 1 TABLET (50 MG TOTAL) BY MOUTH IF NEEDED FOR MIGRAINE.      omeprazole (PRILOSEC) 40 MG delayed release capsule Take 1 capsule by mouth daily       No current facility-administered medications on file prior to visit.       Pertinent items are noted in HPI  Review of systems reviewed from Patient History Form and available in the patient's chart

## 2025-07-08 ENCOUNTER — TELEPHONE (OUTPATIENT)
Dept: ORTHOPEDIC SURGERY | Age: 55
End: 2025-07-08

## 2025-07-08 NOTE — TELEPHONE ENCOUNTER
SW patient. Advised him that the MRI was denied. He stated he completed PT at [place]. He is going to send the PT notes through [employer]. The PT notes with the denial letter will be faxed to his insurance company.

## 2025-07-09 ENCOUNTER — TELEPHONE (OUTPATIENT)
Dept: ORTHOPEDIC SURGERY | Age: 55
End: 2025-07-09

## 2025-07-09 NOTE — TELEPHONE ENCOUNTER
KARIME RODRIGUEZ, She stated that Dr. Emmanuel 7/11/25 to complete the P2P.     Phone Number: [phone]   Case Number: [id number]

## 2025-07-10 ENCOUNTER — TRANSCRIBE ORDERS (OUTPATIENT)
Dept: ADMINISTRATIVE | Age: 55
End: 2025-07-10

## 2025-07-10 DIAGNOSIS — M54.16 LUMBAR RADICULOPATHY: Primary | ICD-10-CM

## 2025-07-10 NOTE — TELEPHONE ENCOUNTER
JONATHANP completed.   Approved.   Confirmation # [id number]    Exp date 7/26/25    Called patient to update.

## 2025-07-18 ENCOUNTER — HOSPITAL ENCOUNTER (OUTPATIENT)
Dept: MRI IMAGING | Age: 55
Discharge: HOME | End: 2025-07-18
Payer: COMMERCIAL

## 2025-07-18 DIAGNOSIS — M54.16 LUMBAR RADICULOPATHY: ICD-10-CM

## 2025-07-18 PROCEDURE — 72148 MRI LUMBAR SPINE W/O DYE: CPT

## 2025-07-23 ENCOUNTER — TELEPHONE (OUTPATIENT)
Dept: FAMILY MEDICINE CLINIC | Age: 55
End: 2025-07-23

## 2025-07-23 NOTE — TELEPHONE ENCOUNTER
----- Message from Rosana VALDERRAMA sent at 7/23/2025  1:21 PM EDT -----  Regarding: ECC Message to Provider  ECC Message to Provider    Relationship to Patient: Self     Additional Information: patient wanted to know the process of transferring medical record from his previous provider Ernestina Anne from [facility].  Phone number: [phone]  --------------------------------------------------------------------------------------------------------------------------    Call Back Information: OK to leave message on voicemail  Preferred Call Back Number: Phone [phone]

## 2025-07-24 ENCOUNTER — HOSPITAL ENCOUNTER (OUTPATIENT)
Dept: MRI IMAGING | Age: 55
Discharge: HOME | End: 2025-07-24
Attending: ORTHOPAEDIC SURGERY
Payer: COMMERCIAL

## 2025-07-24 DIAGNOSIS — S40.011A CONTUSION OF RIGHT SHOULDER, INITIAL ENCOUNTER: ICD-10-CM

## 2025-07-24 PROCEDURE — 73221 MRI JOINT UPR EXTREM W/O DYE: CPT

## 2025-07-29 ENCOUNTER — OFFICE VISIT (OUTPATIENT)
Dept: ORTHOPEDIC SURGERY | Age: 55
End: 2025-07-29
Payer: COMMERCIAL

## 2025-07-29 VITALS — HEIGHT: 69 IN | WEIGHT: 195 LBS | BODY MASS INDEX: 28.88 KG/M2

## 2025-07-29 DIAGNOSIS — S40.011A CONTUSION OF RIGHT SHOULDER, INITIAL ENCOUNTER: Primary | ICD-10-CM

## 2025-07-29 PROCEDURE — 99214 OFFICE O/P EST MOD 30 MIN: CPT | Performed by: ORTHOPAEDIC SURGERY

## 2025-07-29 NOTE — PROGRESS NOTES
under the Media tab. No change noted.      PHYSICAL EXAMINATION:  Mr. Price is a very pleasant 54 y.o.  male who presents today in no acute distress, awake, alert, and oriented.  He is well dressed, nourished and  groomed.  Patient with normal affect.  Height is  1.753 m (5' 9\"), weight is 88.5 kg (195 lb), Body mass index is 28.8 kg/m².  Resting respiratory rate is 16.     The patient walks with no limp. On evaluation of his bilateral upper extremity, there is no obvious deformity right shoulder.  There is minimal swelling and no ecchymosis.  He is tender to palpation over the shoulder, and otherwise non tender over the remainder of the extremity.  Range of motion is decreased secondary to pain over the right shoulder.  The skin overlying the right shoulder is intact without evidence of lesion, laceration.  Distal pulses are 2+ and symmetric bilaterally.  Sensation is grossly intact to light touch and symmetric bilaterally.    IMAGING:  Xrays dated 2/23/2025, 3 views of right shoulder were reviewed, and showed no fracture.    MRI right shoulder dated 7/24/2025 was reviewed and showed;    High-grade partial if not complete tear of the subscapular tendon from the  lesser tuberosity insertion with retraction of the torn fibers.     Medially positioned long head biceps tendon consistent with pulley lesion.     Mild degeneration of the AC joint.     Trace nonspecific joint effusion.    IMPRESSION: Right shoulder pain/ shoulder RTC tear.    PLAN:   I discussed with Eh Price the MRI and treatment options including both surgical and non-surgical treatment, and that my recommendation is to see Dr Jiménez to discuss possible surrgical options. NSAIDs OTC.        Lm Emmanuel MD

## 2025-07-31 ENCOUNTER — OFFICE VISIT (OUTPATIENT)
Dept: ORTHOPEDIC SURGERY | Age: 55
End: 2025-07-31
Payer: COMMERCIAL

## 2025-07-31 ENCOUNTER — HOSPITAL ENCOUNTER (OUTPATIENT)
Age: 55
Discharge: HOME | End: 2025-07-31
Payer: COMMERCIAL

## 2025-07-31 ENCOUNTER — HOSPITAL ENCOUNTER (OUTPATIENT)
Dept: GENERAL RADIOLOGY | Age: 55
Discharge: HOME | End: 2025-07-31
Payer: COMMERCIAL

## 2025-07-31 ENCOUNTER — HOSPITAL ENCOUNTER (OUTPATIENT)
Dept: CT IMAGING | Age: 55
Discharge: HOME | End: 2025-07-31
Payer: COMMERCIAL

## 2025-07-31 VITALS — WEIGHT: 196 LBS | BODY MASS INDEX: 29.03 KG/M2 | HEIGHT: 69 IN | RESPIRATION RATE: 16 BRPM

## 2025-07-31 DIAGNOSIS — Z87.891 FORMER CIGARETTE SMOKER: ICD-10-CM

## 2025-07-31 DIAGNOSIS — S43.001A SUBLUXATION OF TENDON OF LONG HEAD OF RIGHT BICEPS: ICD-10-CM

## 2025-07-31 DIAGNOSIS — S46.011A TRAUMATIC COMPLETE TEAR OF RIGHT ROTATOR CUFF, INITIAL ENCOUNTER: Primary | ICD-10-CM

## 2025-07-31 DIAGNOSIS — Z98.1 ARTHRODESIS STATUS: ICD-10-CM

## 2025-07-31 PROCEDURE — 72040 X-RAY EXAM NECK SPINE 2-3 VW: CPT

## 2025-07-31 PROCEDURE — 99214 OFFICE O/P EST MOD 30 MIN: CPT | Performed by: ORTHOPAEDIC SURGERY

## 2025-07-31 PROCEDURE — 71271 CT THORAX LUNG CANCER SCR C-: CPT

## 2025-07-31 NOTE — PROGRESS NOTES
CHIEF COMPLAINT: Right shoulder pain    DATE OF INJURY: 2/23/25    History:    Eh Price is a 54 y.o. right handed male referred by Lm Emmanuel MD for evaluation and treatment of Right shoulder pain.   This is evaluated as a personal injury.   The pain began 5 months ago.  Pain is rated as a 3/10.   There was an injury. He pulled a table and felt a pop in his shoulder.  Pain is located laterally.  Symptoms are worse with reaching, lifting, laying on his side, overhead activity.  The patient has had PT at [facility] after his neck surgery, with what he describes them also doing rotator cuff and periscapular strengthening exercises. The patient has not had an injection.   The patient has not tried NSAIDs because she just had ACDF 4 weeks ago. The patient has tried ice, with relief.   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  But, they had just switched him to a  position.  He had not been back to work because of his ACDF and now the shoulder issues.  However, he also states that the plant just closed.      Past Medical History:   Diagnosis Date    ADHD     Anaphylaxis due to tree nuts or seeds     Anxiety     Arthritis     in neck    Asymmetrical sensorineural hearing loss     Carpal tunnel syndrome, left upper limb     Cataracts, bilateral     Cervicalgia     COPD (chronic obstructive pulmonary disease) (HCC)     Depression     Emphysema lung (HCC)     Environmental allergies     GERD (gastroesophageal reflux disease)     H/O thyroid nodule     Hypertension     Lung disease     Migraine headache     Mixed anxiety and depressive disorder     Thyroid disease     disease of thyroid gland - no longer on medications    Varicella     as a child       Past Surgical History:   Procedure Laterality Date    ANAL FISTULOTOMY      I&D perirectal abscess    ANTERIOR CERVICAL DISCECTOMY      BACK SURGERY      cervical discectomy and fusion - 2 levels    CARPAL TUNNEL RELEASE

## 2025-08-01 PROBLEM — S43.001A SUBLUXATION OF RIGHT SHOULDER JOINT: Status: ACTIVE | Noted: 2025-08-01

## 2025-08-01 PROBLEM — S46.011A TRAUMATIC TEAR OF RIGHT ROTATOR CUFF: Status: ACTIVE | Noted: 2025-08-01

## 2025-08-02 SDOH — HEALTH STABILITY: PHYSICAL HEALTH: ON AVERAGE, HOW MANY DAYS PER WEEK DO YOU ENGAGE IN MODERATE TO STRENUOUS EXERCISE (LIKE A BRISK WALK)?: 0 DAYS

## 2025-08-02 SDOH — HEALTH STABILITY: PHYSICAL HEALTH: ON AVERAGE, HOW MANY MINUTES DO YOU ENGAGE IN EXERCISE AT THIS LEVEL?: 0 MIN

## 2025-08-04 ENCOUNTER — OFFICE VISIT (OUTPATIENT)
Dept: FAMILY MEDICINE CLINIC | Age: 55
End: 2025-08-04
Payer: COMMERCIAL

## 2025-08-04 VITALS
WEIGHT: 197 LBS | HEART RATE: 75 BPM | HEIGHT: 69 IN | DIASTOLIC BLOOD PRESSURE: 72 MMHG | SYSTOLIC BLOOD PRESSURE: 114 MMHG | BODY MASS INDEX: 29.18 KG/M2 | OXYGEN SATURATION: 99 % | TEMPERATURE: 98 F

## 2025-08-04 DIAGNOSIS — Z98.1 S/P CERVICAL SPINAL FUSION: ICD-10-CM

## 2025-08-04 DIAGNOSIS — D50.8 IRON DEFICIENCY ANEMIA SECONDARY TO INADEQUATE DIETARY IRON INTAKE: ICD-10-CM

## 2025-08-04 DIAGNOSIS — R25.2 CRAMP AND SPASM: ICD-10-CM

## 2025-08-04 DIAGNOSIS — I10 PRIMARY HYPERTENSION: Primary | ICD-10-CM

## 2025-08-04 PROBLEM — Z86.19 H/O SYPHILIS: Status: RESOLVED | Noted: 2024-11-05 | Resolved: 2025-08-04

## 2025-08-04 PROBLEM — Z86.19: Status: RESOLVED | Noted: 2024-11-05 | Resolved: 2025-08-04

## 2025-08-04 PROBLEM — G98.8 NEUROLOGIC DISORDER: Status: RESOLVED | Noted: 2024-11-05 | Resolved: 2025-08-04

## 2025-08-04 PROCEDURE — 3074F SYST BP LT 130 MM HG: CPT

## 2025-08-04 PROCEDURE — 3078F DIAST BP <80 MM HG: CPT

## 2025-08-04 PROCEDURE — 99203 OFFICE O/P NEW LOW 30 MIN: CPT

## 2025-08-04 RX ORDER — BACLOFEN 20 MG/1
20 TABLET ORAL 3 TIMES DAILY
COMMUNITY

## 2025-08-04 RX ORDER — MAGNESIUM 200 MG
200 TABLET ORAL DAILY
COMMUNITY

## 2025-08-04 SDOH — ECONOMIC STABILITY: FOOD INSECURITY: WITHIN THE PAST 12 MONTHS, THE FOOD YOU BOUGHT JUST DIDN'T LAST AND YOU DIDN'T HAVE MONEY TO GET MORE.: NEVER TRUE

## 2025-08-04 SDOH — ECONOMIC STABILITY: FOOD INSECURITY: WITHIN THE PAST 12 MONTHS, YOU WORRIED THAT YOUR FOOD WOULD RUN OUT BEFORE YOU GOT MONEY TO BUY MORE.: NEVER TRUE

## 2025-08-04 ASSESSMENT — PATIENT HEALTH QUESTIONNAIRE - PHQ9
SUM OF ALL RESPONSES TO PHQ QUESTIONS 1-9: 0
1. LITTLE INTEREST OR PLEASURE IN DOING THINGS: NOT AT ALL
SUM OF ALL RESPONSES TO PHQ QUESTIONS 1-9: 0
2. FEELING DOWN, DEPRESSED OR HOPELESS: NOT AT ALL
SUM OF ALL RESPONSES TO PHQ QUESTIONS 1-9: 0
SUM OF ALL RESPONSES TO PHQ QUESTIONS 1-9: 0

## 2025-08-04 ASSESSMENT — ENCOUNTER SYMPTOMS
ABDOMINAL PAIN: 0
NAUSEA: 0
APNEA: 0
TROUBLE SWALLOWING: 0
SINUS PRESSURE: 0
CONSTIPATION: 0
COLOR CHANGE: 0
VOMITING: 0
COUGH: 0
BACK PAIN: 0
SORE THROAT: 0
DIARRHEA: 0
SHORTNESS OF BREATH: 0
WHEEZING: 0
BLOOD IN STOOL: 0

## 2025-08-20 ENCOUNTER — TELEPHONE (OUTPATIENT)
Dept: ORTHOPEDIC SURGERY | Age: 55
End: 2025-08-20

## 2025-08-20 ENCOUNTER — OFFICE VISIT (OUTPATIENT)
Dept: FAMILY MEDICINE CLINIC | Age: 55
End: 2025-08-20
Payer: COMMERCIAL

## 2025-08-20 VITALS
DIASTOLIC BLOOD PRESSURE: 64 MMHG | HEIGHT: 69 IN | BODY MASS INDEX: 28.73 KG/M2 | WEIGHT: 194 LBS | TEMPERATURE: 98.1 F | HEART RATE: 71 BPM | SYSTOLIC BLOOD PRESSURE: 104 MMHG | OXYGEN SATURATION: 98 %

## 2025-08-20 DIAGNOSIS — J41.0 SIMPLE CHRONIC BRONCHITIS (HCC): ICD-10-CM

## 2025-08-20 DIAGNOSIS — I10 PRIMARY HYPERTENSION: Primary | ICD-10-CM

## 2025-08-20 DIAGNOSIS — Z01.818 PREOPERATIVE EXAMINATION: ICD-10-CM

## 2025-08-20 DIAGNOSIS — D50.8 IRON DEFICIENCY ANEMIA SECONDARY TO INADEQUATE DIETARY IRON INTAKE: ICD-10-CM

## 2025-08-20 PROCEDURE — 3078F DIAST BP <80 MM HG: CPT

## 2025-08-20 PROCEDURE — 3074F SYST BP LT 130 MM HG: CPT

## 2025-08-20 PROCEDURE — 99213 OFFICE O/P EST LOW 20 MIN: CPT

## 2025-08-20 SDOH — ECONOMIC STABILITY: FOOD INSECURITY: WITHIN THE PAST 12 MONTHS, YOU WORRIED THAT YOUR FOOD WOULD RUN OUT BEFORE YOU GOT MONEY TO BUY MORE.: NEVER TRUE

## 2025-08-20 SDOH — ECONOMIC STABILITY: FOOD INSECURITY: WITHIN THE PAST 12 MONTHS, THE FOOD YOU BOUGHT JUST DIDN'T LAST AND YOU DIDN'T HAVE MONEY TO GET MORE.: NEVER TRUE

## 2025-08-20 ASSESSMENT — ENCOUNTER SYMPTOMS
APNEA: 0
WHEEZING: 0
RHINORRHEA: 0
SORE THROAT: 0
COUGH: 0
NAUSEA: 0
CONSTIPATION: 0
SHORTNESS OF BREATH: 0
VOMITING: 0
BACK PAIN: 0
ROS SKIN COMMENTS: NO HISTORY OF MRSA
COLOR CHANGE: 0
TROUBLE SWALLOWING: 0
ABDOMINAL PAIN: 0
DIARRHEA: 0
CHEST TIGHTNESS: 0

## 2025-08-20 ASSESSMENT — PATIENT HEALTH QUESTIONNAIRE - PHQ9
2. FEELING DOWN, DEPRESSED OR HOPELESS: NOT AT ALL
1. LITTLE INTEREST OR PLEASURE IN DOING THINGS: NOT AT ALL
SUM OF ALL RESPONSES TO PHQ QUESTIONS 1-9: 0

## 2025-08-21 RX ORDER — ACETAMINOPHEN 500 MG
500 TABLET ORAL EVERY 6 HOURS PRN
Status: ON HOLD | COMMUNITY
End: 2025-08-29 | Stop reason: HOSPADM

## 2025-08-28 ENCOUNTER — ANESTHESIA EVENT (OUTPATIENT)
Dept: OPERATING ROOM | Age: 55
End: 2025-08-28
Payer: COMMERCIAL

## 2025-08-29 ENCOUNTER — HOSPITAL ENCOUNTER (OUTPATIENT)
Age: 55
Setting detail: OUTPATIENT SURGERY
Discharge: HOME | End: 2025-08-29
Attending: ORTHOPAEDIC SURGERY | Admitting: ORTHOPAEDIC SURGERY
Payer: COMMERCIAL

## 2025-08-29 ENCOUNTER — ANESTHESIA (OUTPATIENT)
Dept: OPERATING ROOM | Age: 55
End: 2025-08-29
Payer: COMMERCIAL

## 2025-08-29 VITALS
OXYGEN SATURATION: 99 % | SYSTOLIC BLOOD PRESSURE: 140 MMHG | BODY MASS INDEX: 27.51 KG/M2 | WEIGHT: 185.74 LBS | RESPIRATION RATE: 16 BRPM | TEMPERATURE: 97.1 F | DIASTOLIC BLOOD PRESSURE: 79 MMHG | HEART RATE: 55 BPM | HEIGHT: 69 IN

## 2025-08-29 DIAGNOSIS — S46.011A TRAUMATIC COMPLETE TEAR OF RIGHT ROTATOR CUFF, INITIAL ENCOUNTER: Primary | ICD-10-CM

## 2025-08-29 PROCEDURE — 2500000003 HC RX 250 WO HCPCS

## 2025-08-29 PROCEDURE — 6360000002 HC RX W HCPCS: Performed by: ORTHOPAEDIC SURGERY

## 2025-08-29 PROCEDURE — 6370000000 HC RX 637 (ALT 250 FOR IP): Performed by: ANESTHESIOLOGY

## 2025-08-29 PROCEDURE — 7100000001 HC PACU RECOVERY - ADDTL 15 MIN: Performed by: ORTHOPAEDIC SURGERY

## 2025-08-29 PROCEDURE — 6360000002 HC RX W HCPCS: Performed by: ANESTHESIOLOGY

## 2025-08-29 PROCEDURE — 3600000004 HC SURGERY LEVEL 4 BASE: Performed by: ORTHOPAEDIC SURGERY

## 2025-08-29 PROCEDURE — 3700000001 HC ADD 15 MINUTES (ANESTHESIA): Performed by: ORTHOPAEDIC SURGERY

## 2025-08-29 PROCEDURE — 7100000011 HC PHASE II RECOVERY - ADDTL 15 MIN: Performed by: ORTHOPAEDIC SURGERY

## 2025-08-29 PROCEDURE — 6360000002 HC RX W HCPCS

## 2025-08-29 PROCEDURE — 7100000010 HC PHASE II RECOVERY - FIRST 15 MIN: Performed by: ORTHOPAEDIC SURGERY

## 2025-08-29 PROCEDURE — 2720000010 HC SURG SUPPLY STERILE: Performed by: ORTHOPAEDIC SURGERY

## 2025-08-29 PROCEDURE — 7100000000 HC PACU RECOVERY - FIRST 15 MIN: Performed by: ORTHOPAEDIC SURGERY

## 2025-08-29 PROCEDURE — C1713 ANCHOR/SCREW BN/BN,TIS/BN: HCPCS | Performed by: ORTHOPAEDIC SURGERY

## 2025-08-29 PROCEDURE — 2580000003 HC RX 258: Performed by: ANESTHESIOLOGY

## 2025-08-29 PROCEDURE — C1769 GUIDE WIRE: HCPCS | Performed by: ORTHOPAEDIC SURGERY

## 2025-08-29 PROCEDURE — 2500000003 HC RX 250 WO HCPCS: Performed by: ORTHOPAEDIC SURGERY

## 2025-08-29 PROCEDURE — 3700000000 HC ANESTHESIA ATTENDED CARE: Performed by: ORTHOPAEDIC SURGERY

## 2025-08-29 PROCEDURE — L3650 SO 8 ABD RESTRAINT PRE OTS: HCPCS | Performed by: ORTHOPAEDIC SURGERY

## 2025-08-29 PROCEDURE — 2580000003 HC RX 258: Performed by: ORTHOPAEDIC SURGERY

## 2025-08-29 PROCEDURE — 64415 NJX AA&/STRD BRCH PLXS IMG: CPT | Performed by: ANESTHESIOLOGY

## 2025-08-29 PROCEDURE — 3600000014 HC SURGERY LEVEL 4 ADDTL 15MIN: Performed by: ORTHOPAEDIC SURGERY

## 2025-08-29 PROCEDURE — 2709999900 HC NON-CHARGEABLE SUPPLY: Performed by: ORTHOPAEDIC SURGERY

## 2025-08-29 DEVICE — ANCHOR SUT L24.5MM DIA4.75MM BIOCOMPOSITE SELF PUNCHING: Type: IMPLANTABLE DEVICE | Site: SHOULDER | Status: FUNCTIONAL

## 2025-08-29 RX ORDER — SODIUM CHLORIDE 0.9 % (FLUSH) 0.9 %
5-40 SYRINGE (ML) INJECTION EVERY 12 HOURS SCHEDULED
Status: DISCONTINUED | OUTPATIENT
Start: 2025-08-29 | End: 2025-08-29 | Stop reason: HOSPADM

## 2025-08-29 RX ORDER — ROPIVACAINE HYDROCHLORIDE 5 MG/ML
INJECTION, SOLUTION EPIDURAL; INFILTRATION; PERINEURAL
Status: COMPLETED | OUTPATIENT
Start: 2025-08-29 | End: 2025-08-29

## 2025-08-29 RX ORDER — FENTANYL CITRATE 50 UG/ML
50 INJECTION, SOLUTION INTRAMUSCULAR; INTRAVENOUS EVERY 5 MIN PRN
Status: DISCONTINUED | OUTPATIENT
Start: 2025-08-29 | End: 2025-08-29 | Stop reason: HOSPADM

## 2025-08-29 RX ORDER — MEPERIDINE HYDROCHLORIDE 50 MG/ML
12.5 INJECTION INTRAMUSCULAR; INTRAVENOUS; SUBCUTANEOUS EVERY 5 MIN PRN
Status: DISCONTINUED | OUTPATIENT
Start: 2025-08-29 | End: 2025-08-29 | Stop reason: HOSPADM

## 2025-08-29 RX ORDER — FENTANYL CITRATE 50 UG/ML
50 INJECTION, SOLUTION INTRAMUSCULAR; INTRAVENOUS ONCE
Status: COMPLETED | OUTPATIENT
Start: 2025-08-29 | End: 2025-08-29

## 2025-08-29 RX ORDER — SODIUM CHLORIDE 0.9 % (FLUSH) 0.9 %
5-40 SYRINGE (ML) INJECTION PRN
Status: DISCONTINUED | OUTPATIENT
Start: 2025-08-29 | End: 2025-08-29

## 2025-08-29 RX ORDER — FENTANYL CITRATE 50 UG/ML
25 INJECTION, SOLUTION INTRAMUSCULAR; INTRAVENOUS EVERY 5 MIN PRN
Status: DISCONTINUED | OUTPATIENT
Start: 2025-08-29 | End: 2025-08-29 | Stop reason: HOSPADM

## 2025-08-29 RX ORDER — OXYCODONE AND ACETAMINOPHEN 5; 325 MG/1; MG/1
1 TABLET ORAL EVERY 4 HOURS PRN
Qty: 40 TABLET | Refills: 0 | Status: SHIPPED | OUTPATIENT
Start: 2025-08-29 | End: 2025-09-05

## 2025-08-29 RX ORDER — DEXAMETHASONE SODIUM PHOSPHATE 4 MG/ML
INJECTION, SOLUTION INTRA-ARTICULAR; INTRALESIONAL; INTRAMUSCULAR; INTRAVENOUS; SOFT TISSUE
Status: DISCONTINUED | OUTPATIENT
Start: 2025-08-29 | End: 2025-08-29 | Stop reason: SDUPTHER

## 2025-08-29 RX ORDER — SODIUM CHLORIDE 9 MG/ML
INJECTION, SOLUTION INTRAVENOUS PRN
Status: DISCONTINUED | OUTPATIENT
Start: 2025-08-29 | End: 2025-08-29

## 2025-08-29 RX ORDER — SODIUM CHLORIDE 0.9 % (FLUSH) 0.9 %
5-40 SYRINGE (ML) INJECTION EVERY 12 HOURS SCHEDULED
Status: DISCONTINUED | OUTPATIENT
Start: 2025-08-29 | End: 2025-08-29

## 2025-08-29 RX ORDER — LIDOCAINE HYDROCHLORIDE 20 MG/ML
INJECTION, SOLUTION EPIDURAL; INFILTRATION; INTRACAUDAL; PERINEURAL
Status: DISCONTINUED | OUTPATIENT
Start: 2025-08-29 | End: 2025-08-29 | Stop reason: SDUPTHER

## 2025-08-29 RX ORDER — ONDANSETRON 2 MG/ML
INJECTION INTRAMUSCULAR; INTRAVENOUS
Status: DISCONTINUED | OUTPATIENT
Start: 2025-08-29 | End: 2025-08-29 | Stop reason: SDUPTHER

## 2025-08-29 RX ORDER — SUCCINYLCHOLINE/SOD CL,ISO/PF 200MG/10ML
SYRINGE (ML) INTRAVENOUS
Status: DISCONTINUED | OUTPATIENT
Start: 2025-08-29 | End: 2025-08-29 | Stop reason: SDUPTHER

## 2025-08-29 RX ORDER — FENTANYL CITRATE 50 UG/ML
INJECTION, SOLUTION INTRAMUSCULAR; INTRAVENOUS
Status: DISCONTINUED | OUTPATIENT
Start: 2025-08-29 | End: 2025-08-29 | Stop reason: SDUPTHER

## 2025-08-29 RX ORDER — OXYCODONE HYDROCHLORIDE 10 MG/1
10 TABLET ORAL PRN
Status: COMPLETED | OUTPATIENT
Start: 2025-08-29 | End: 2025-08-29

## 2025-08-29 RX ORDER — ROPIVACAINE HYDROCHLORIDE 5 MG/ML
30 INJECTION, SOLUTION EPIDURAL; INFILTRATION; PERINEURAL ONCE
Status: DISCONTINUED | OUTPATIENT
Start: 2025-08-29 | End: 2025-08-29 | Stop reason: HOSPADM

## 2025-08-29 RX ORDER — SODIUM CHLORIDE 0.9 % (FLUSH) 0.9 %
5-40 SYRINGE (ML) INJECTION PRN
Status: DISCONTINUED | OUTPATIENT
Start: 2025-08-29 | End: 2025-08-29 | Stop reason: HOSPADM

## 2025-08-29 RX ORDER — OXYCODONE HYDROCHLORIDE 5 MG/1
5 TABLET ORAL PRN
Status: COMPLETED | OUTPATIENT
Start: 2025-08-29 | End: 2025-08-29

## 2025-08-29 RX ORDER — SODIUM CHLORIDE 9 MG/ML
INJECTION, SOLUTION INTRAVENOUS PRN
Status: DISCONTINUED | OUTPATIENT
Start: 2025-08-29 | End: 2025-08-29 | Stop reason: HOSPADM

## 2025-08-29 RX ORDER — PROPOFOL 10 MG/ML
INJECTION, EMULSION INTRAVENOUS
Status: DISCONTINUED | OUTPATIENT
Start: 2025-08-29 | End: 2025-08-29 | Stop reason: SDUPTHER

## 2025-08-29 RX ORDER — PROMETHAZINE HYDROCHLORIDE 25 MG/1
25 TABLET ORAL EVERY 6 HOURS PRN
Qty: 5 TABLET | Refills: 0 | Status: SHIPPED | OUTPATIENT
Start: 2025-08-29

## 2025-08-29 RX ORDER — MAGNESIUM HYDROXIDE 1200 MG/15ML
LIQUID ORAL CONTINUOUS PRN
Status: DISCONTINUED | OUTPATIENT
Start: 2025-08-29 | End: 2025-08-29 | Stop reason: HOSPADM

## 2025-08-29 RX ORDER — ROCURONIUM BROMIDE 10 MG/ML
INJECTION, SOLUTION INTRAVENOUS
Status: DISCONTINUED | OUTPATIENT
Start: 2025-08-29 | End: 2025-08-29 | Stop reason: SDUPTHER

## 2025-08-29 RX ORDER — MIDAZOLAM HYDROCHLORIDE 1 MG/ML
1 INJECTION, SOLUTION INTRAMUSCULAR; INTRAVENOUS ONCE
Status: COMPLETED | OUTPATIENT
Start: 2025-08-29 | End: 2025-08-29

## 2025-08-29 RX ORDER — ONDANSETRON 2 MG/ML
4 INJECTION INTRAMUSCULAR; INTRAVENOUS
Status: DISCONTINUED | OUTPATIENT
Start: 2025-08-29 | End: 2025-08-29 | Stop reason: HOSPADM

## 2025-08-29 RX ADMIN — OXYCODONE HYDROCHLORIDE 10 MG: 10 TABLET ORAL at 09:14

## 2025-08-29 RX ADMIN — SODIUM CHLORIDE 2000 MG: 9 INJECTION, SOLUTION INTRAVENOUS at 07:39

## 2025-08-29 RX ADMIN — ONDANSETRON 4 MG: 2 INJECTION INTRAMUSCULAR; INTRAVENOUS at 08:18

## 2025-08-29 RX ADMIN — ROCURONIUM BROMIDE 20 MG: 10 SOLUTION INTRAVENOUS at 08:03

## 2025-08-29 RX ADMIN — FENTANYL CITRATE 50 MCG: 50 INJECTION INTRAMUSCULAR; INTRAVENOUS at 07:33

## 2025-08-29 RX ADMIN — LIDOCAINE HYDROCHLORIDE 100 MG: 20 INJECTION, SOLUTION EPIDURAL; INFILTRATION; INTRACAUDAL; PERINEURAL at 07:33

## 2025-08-29 RX ADMIN — ROCURONIUM BROMIDE 40 MG: 10 SOLUTION INTRAVENOUS at 07:40

## 2025-08-29 RX ADMIN — ROPIVACAINE HYDROCHLORIDE 30 ML: 5 INJECTION, SOLUTION EPIDURAL; INFILTRATION; PERINEURAL at 07:18

## 2025-08-29 RX ADMIN — Medication 140 MG: at 07:34

## 2025-08-29 RX ADMIN — DEXAMETHASONE SODIUM PHOSPHATE 10 MG: 4 INJECTION, SOLUTION INTRAMUSCULAR; INTRAVENOUS at 07:40

## 2025-08-29 RX ADMIN — FENTANYL CITRATE 50 MCG: 50 INJECTION INTRAMUSCULAR; INTRAVENOUS at 07:12

## 2025-08-29 RX ADMIN — SUGAMMADEX 200 MG: 100 INJECTION, SOLUTION INTRAVENOUS at 08:18

## 2025-08-29 RX ADMIN — PROPOFOL 150 MG: 10 INJECTION, EMULSION INTRAVENOUS at 07:33

## 2025-08-29 RX ADMIN — FENTANYL CITRATE 25 MCG: 50 INJECTION INTRAMUSCULAR; INTRAVENOUS at 08:44

## 2025-08-29 RX ADMIN — ROCURONIUM BROMIDE 10 MG: 10 SOLUTION INTRAVENOUS at 07:33

## 2025-08-29 RX ADMIN — MIDAZOLAM HYDROCHLORIDE 2 MG: 1 INJECTION, SOLUTION INTRAMUSCULAR; INTRAVENOUS at 07:11

## 2025-08-29 RX ADMIN — SODIUM CHLORIDE: 9 INJECTION, SOLUTION INTRAVENOUS at 07:27

## 2025-08-29 ASSESSMENT — PAIN SCALES - GENERAL
PAINLEVEL_OUTOF10: 7
PAINLEVEL_OUTOF10: 4
PAINLEVEL_OUTOF10: 4
PAINLEVEL_OUTOF10: 0

## 2025-08-29 ASSESSMENT — PAIN DESCRIPTION - FREQUENCY: FREQUENCY: INTERMITTENT

## 2025-08-29 ASSESSMENT — PAIN DESCRIPTION - DESCRIPTORS: DESCRIPTORS: ACHING;DISCOMFORT

## 2025-08-29 ASSESSMENT — PAIN DESCRIPTION - PAIN TYPE: TYPE: CHRONIC PAIN

## 2025-08-29 ASSESSMENT — PAIN - FUNCTIONAL ASSESSMENT
PAIN_FUNCTIONAL_ASSESSMENT: ACTIVITIES ARE NOT PREVENTED
PAIN_FUNCTIONAL_ASSESSMENT: 0-10
PAIN_FUNCTIONAL_ASSESSMENT: 0-10

## 2025-08-29 ASSESSMENT — PAIN DESCRIPTION - ONSET: ONSET: ON-GOING

## 2025-08-29 ASSESSMENT — PAIN DESCRIPTION - LOCATION: LOCATION: SHOULDER;NECK

## 2025-08-29 ASSESSMENT — LIFESTYLE VARIABLES: SMOKING_STATUS: 0

## 2025-08-29 ASSESSMENT — ENCOUNTER SYMPTOMS: SHORTNESS OF BREATH: 0

## 2025-09-02 ENCOUNTER — PATIENT MESSAGE (OUTPATIENT)
Dept: FAMILY MEDICINE CLINIC | Age: 55
End: 2025-09-02

## 2025-09-02 DIAGNOSIS — J30.2 SEASONAL ALLERGIES: Chronic | ICD-10-CM

## 2025-09-02 DIAGNOSIS — R09.81 NASAL CONGESTION: Chronic | ICD-10-CM

## 2025-09-02 DIAGNOSIS — J34.2 NASAL SEPTAL DEVIATION: Chronic | ICD-10-CM

## 2025-09-03 ENCOUNTER — OFFICE VISIT (OUTPATIENT)
Dept: ORTHOPEDIC SURGERY | Age: 55
End: 2025-09-03

## 2025-09-03 VITALS — HEIGHT: 69 IN | WEIGHT: 192 LBS | RESPIRATION RATE: 16 BRPM | BODY MASS INDEX: 28.44 KG/M2

## 2025-09-03 DIAGNOSIS — S46.011A TRAUMATIC COMPLETE TEAR OF RIGHT ROTATOR CUFF, INITIAL ENCOUNTER: Primary | ICD-10-CM

## 2025-09-03 PROCEDURE — 99024 POSTOP FOLLOW-UP VISIT: CPT | Performed by: ORTHOPAEDIC SURGERY

## 2025-09-03 RX ORDER — GABAPENTIN 800 MG/1
800 TABLET ORAL 3 TIMES DAILY
Qty: 90 TABLET | Refills: 0 | Status: SHIPPED | OUTPATIENT
Start: 2025-09-03 | End: 2025-10-03

## 2025-09-03 RX ORDER — FLUTICASONE PROPIONATE 50 MCG
SPRAY, SUSPENSION (ML) NASAL
Qty: 16 G | Refills: 2 | Status: SHIPPED | OUTPATIENT
Start: 2025-09-03

## 2025-09-07 ENCOUNTER — TELEPHONE (OUTPATIENT)
Dept: ORTHOPEDIC SURGERY | Age: 55
End: 2025-09-07

## (undated) PROCEDURE — 01N50ZZ RELEASE MEDIAN NERVE, OPEN APPROACH: ICD-10-PCS

## (undated) PROCEDURE — 0RB30ZZ EXCISION OF CERVICAL VERTEBRAL DISC, OPEN APPROACH: ICD-10-PCS

## (undated) PROCEDURE — 0RG10A0 FUSION OF CERVICAL VERTEBRAL JOINT WITH INTERBODY FUSION DEVICE, ANTERIOR APPROACH, ANTERIOR COLUMN, OPEN APPROACH: ICD-10-PCS

## (undated) DEVICE — LAMINECTOMY: Brand: MEDLINE INDUSTRIES, INC.

## (undated) DEVICE — SUTURE MONOCRYL + SZ 4-0 L27IN ABSRB UD L19MM PS-2 3/8 CIR MCP426H

## (undated) DEVICE — SPONGE,PEANUT,XRAY,ST,SM,3/8",5/CARD: Brand: MEDLINE INDUSTRIES, INC.

## (undated) DEVICE — TOOL MR8-14MH30 MR8 14CM MATCH 3MM: Brand: MIDAS REX MR8

## (undated) DEVICE — GLOVE ORANGE PI 7 1/2   MSG9075

## (undated) DEVICE — STAPLER SKIN LEG L3.9MM DIA0.53MM WIDE ROT HD FOR WND CLSR

## (undated) DEVICE — HAND: Brand: MEDLINE INDUSTRIES, INC.

## (undated) DEVICE — APPLICATOR MEDICATED 26 CC SOLUTION HI LT ORNG CHLORAPREP

## (undated) DEVICE — PROTECTOR ULN NRV PUR FOAM HK LOOP STRP ANATOMICALLY

## (undated) DEVICE — PASSER SUT 45DEG R CRV NIT WIRE LOOP 2 FIBERSTICK MFIL SUT

## (undated) DEVICE — SYRINGE MED 10ML TRNSLUC BRL PLUNG BLK MRK POLYPR CTRL

## (undated) DEVICE — ELECTRODE PT RET AD L9FT HI MOIST COND ADH HYDRGEL CORDED

## (undated) DEVICE — PASSER SUT 25DEG L CRV NIT WIRE LOOP 2 FIBERSTICK MFIL SUT

## (undated) DEVICE — TRAP FLUID

## (undated) DEVICE — GLOVE SURG SZ 65 THK91MIL LTX FREE SYN POLYISOPRENE

## (undated) DEVICE — GAUZE FLUFF 1 PLY: Brand: DEROYAL

## (undated) DEVICE — STERILE VELCLOSE ELASTIC BANDAGE, 3IN: Brand: VELCLOSE

## (undated) DEVICE — ABLATOR ENDOSCOPIC ELECTROCAUTERY 90 DEG RF ASPIRATING STERILE DISPOSABLE APOLLORF I90

## (undated) DEVICE — BANDAGE,GAUZE,BULKEE II,4.5"X4.1YD,STRL: Brand: MEDLINE

## (undated) DEVICE — IMMOBILIZER SHLDR SWTH UNIV DEV BLK P.A.D. III

## (undated) DEVICE — APPLICATOR MEDICATED 10.5 CC SOLUTION HI LT ORNG CHLORAPREP

## (undated) DEVICE — STANDARD HYPODERMIC NEEDLE,POLYPROPYLENE HUB: Brand: MONOJECT

## (undated) DEVICE — GLOVE SURG SZ 65 CRM LTX FREE POLYISOPRENE POLYMER BEAD ANTI

## (undated) DEVICE — COVER LT HNDL CAM BLU DISP W/ SURG CTRL

## (undated) DEVICE — DISSECTOR SPNG PNUT HOLDER 3/8 IN XR DETECTABLE STRL

## (undated) DEVICE — GARMENT,MEDLINE,DVT,INT,CALF,MED, GEN2: Brand: MEDLINE

## (undated) DEVICE — BUR SHV L13CM DIA4MM 8 FLUT OVL FOR RAP AGG BNE RESECT

## (undated) DEVICE — GLOVE SURG SZ 65 L12IN FNGR THK79MIL GRN LTX FREE

## (undated) DEVICE — Device

## (undated) DEVICE — PAD,NON-ADHERENT,3X8,STERILE,LF,1/PK: Brand: MEDLINE

## (undated) DEVICE — SUTURE VICRYL + SZ 3-0 L27IN ABSRB UD L26MM SH 1/2 CIR VCP416H

## (undated) DEVICE — SUTURE PERMAHAND SZ 2-0 L12X18IN NONABSORBABLE BLK SILK A185H

## (undated) DEVICE — BLADE,CARBON-STEEL,15,STRL,DISPOSABLE,TB: Brand: MEDLINE

## (undated) DEVICE — GOWN SURG XL L48IN BLU SMS NONREINFORCED VELC TIE 3 LEV

## (undated) DEVICE — COVER LT HNDL BLU PLAS

## (undated) DEVICE — SUTURE VICRYL + SZ 2-0 L18IN ABSRB UD CT1 L36MM 1/2 CIR VCP839D

## (undated) DEVICE — BLADE SHV L13CM DIA4MM DISECT AGG COOLCUT

## (undated) DEVICE — SPONGE,NEURO,0.5"X0.5",XR,STRL,10/PK: Brand: MEDLINE

## (undated) DEVICE — SUTURE PROL SZ 0 L30IN NONABSORBABLE BLU MO-6 L26MM 1/2 CIR 8418H

## (undated) DEVICE — SOL IRR SOD CHL 0.9% TITAN XL CNTNR 3000ML

## (undated) DEVICE — EYE PROTECTOR FOAM MEDICHOICE

## (undated) DEVICE — LIQUIBAND RAPID ADHESIVE 36/CS 0.8ML: Brand: MEDLINE

## (undated) DEVICE — NEPTUNE E-SEP SMOKE EVACUATION PENCIL, COATED, 70MM BLADE, PUSH BUTTON SWITCH: Brand: NEPTUNE E-SEP

## (undated) DEVICE — AGENT HEMOSTATIC SURGIFLOW MATRIX KIT W/THROMBIN

## (undated) DEVICE — COUNTER NDL 40 COUNT HLD 70 NUM FOAM BLK SGL MAG W BLDE REMV

## (undated) DEVICE — CANNULA ARTHSCP L7CM ID8.25MM TRNSLUC THRD FLX W/ NO SQUIRT

## (undated) DEVICE — SUTURE VICRYL + SZ 3-0 L18IN ABSRB UD SH 1/2 CIR TAPERCUT NDL VCP864D

## (undated) DEVICE — GOWN,SIRUS,POLYRNF,BRTHSLV,XL,30/CS: Brand: MEDLINE

## (undated) DEVICE — UNDERGLOVE SURG SZ 8 BLU LTX FREE SYN POLYISOPRENE POLYMER

## (undated) DEVICE — STOCKINETTE ORTH W12XL48IN COT 2 PLY HLLW FOR HANDLING LMB

## (undated) DEVICE — MEDICINE CUP, GRADUATED, STER: Brand: MEDLINE

## (undated) DEVICE — SUTURE ETHILON SZ 3-0 L18IN NONABSORBABLE BLK PS-2 L19MM 3/8 1669H

## (undated) DEVICE — BLANKET WRM W40.2XL55.9IN IORT LO BODY + MISTRAL AIR

## (undated) DEVICE — GAUZE,SPONGE,4"X4",16PLY,XRAY,STRL,LF: Brand: MEDLINE

## (undated) DEVICE — DRAPE MICSCP W54XL150IN W/ 4 BINOC GLS LENS LEICA

## (undated) DEVICE — SHEET,T,THYROID,STERILE: Brand: MEDLINE

## (undated) DEVICE — SOLUTION IV 1000ML 0.9% SOD CHL

## (undated) DEVICE — SYRINGE EAR PLVNL CHL 3OZ CPCTY TPRD TIP RBBD BULB F / IRRGT

## (undated) DEVICE — TOWEL,STOP FLAG GOLD N-W: Brand: MEDLINE

## (undated) DEVICE — CUFF RESTRN WRST OR ANK 45FT AD FOAM

## (undated) DEVICE — SHEET,DRAPE,53X77,STERILE: Brand: MEDLINE

## (undated) DEVICE — TUBING PMP L16FT MAIN DISP FOR AR-6400 AR-6475 Â€“ ORDER MULTIPLES OF 10 EACH

## (undated) DEVICE — DRAPE SURG IOBAN W17XL23IN FAB ANTIMIC GEN INCIS LNR FULL W HNDL